# Patient Record
Sex: MALE | Race: WHITE | Employment: FULL TIME | URBAN - METROPOLITAN AREA
[De-identification: names, ages, dates, MRNs, and addresses within clinical notes are randomized per-mention and may not be internally consistent; named-entity substitution may affect disease eponyms.]

---

## 2018-05-24 ENCOUNTER — APPOINTMENT (OUTPATIENT)
Dept: CT IMAGING | Age: 43
DRG: 392 | End: 2018-05-24
Attending: EMERGENCY MEDICINE
Payer: COMMERCIAL

## 2018-05-24 ENCOUNTER — HOSPITAL ENCOUNTER (INPATIENT)
Age: 43
LOS: 8 days | Discharge: HOME | DRG: 392 | End: 2018-06-01
Attending: STUDENT IN AN ORGANIZED HEALTH CARE EDUCATION/TRAINING PROGRAM | Admitting: INTERNAL MEDICINE
Payer: COMMERCIAL

## 2018-05-24 DIAGNOSIS — K52.9 NONINFECTIOUS GASTROENTERITIS, UNSPECIFIED TYPE: Primary | ICD-10-CM

## 2018-05-24 PROBLEM — E87.1 HYPONATREMIA: Status: ACTIVE | Noted: 2018-05-24

## 2018-05-24 PROBLEM — M10.9 GOUT: Status: ACTIVE | Noted: 2018-05-24

## 2018-05-24 PROBLEM — R73.9 HYPERGLYCEMIA: Status: ACTIVE | Noted: 2018-05-24

## 2018-05-24 PROBLEM — I10 HTN (HYPERTENSION): Status: ACTIVE | Noted: 2018-05-24

## 2018-05-24 PROBLEM — K76.0 HEPATIC STEATOSIS: Status: ACTIVE | Noted: 2018-05-24

## 2018-05-24 PROBLEM — R74.01 TRANSAMINITIS: Status: ACTIVE | Noted: 2018-05-24

## 2018-05-24 PROBLEM — K57.92 DIVERTICULITIS: Status: ACTIVE | Noted: 2018-05-24

## 2018-05-24 PROBLEM — K40.90 INGUINAL HERNIA: Status: ACTIVE | Noted: 2018-05-24

## 2018-05-24 LAB
ALBUMIN SERPL-MCNC: 3.8 G/DL (ref 3.5–5)
ALBUMIN/GLOB SERPL: 0.8 {RATIO} (ref 1.1–2.2)
ALP SERPL-CCNC: 73 U/L (ref 45–117)
ALT SERPL-CCNC: 79 U/L (ref 12–78)
ANION GAP SERPL CALC-SCNC: 9 MMOL/L (ref 5–15)
APPEARANCE UR: CLEAR
AST SERPL-CCNC: 89 U/L (ref 15–37)
BACTERIA URNS QL MICRO: NEGATIVE /HPF
BASOPHILS # BLD: 0.1 K/UL (ref 0–0.1)
BASOPHILS NFR BLD: 1 % (ref 0–1)
BILIRUB SERPL-MCNC: 1 MG/DL (ref 0.2–1)
BILIRUB UR QL: NEGATIVE
BUN SERPL-MCNC: 8 MG/DL (ref 6–20)
BUN/CREAT SERPL: 9 (ref 12–20)
CALCIUM SERPL-MCNC: 9.2 MG/DL (ref 8.5–10.1)
CHLORIDE SERPL-SCNC: 98 MMOL/L (ref 97–108)
CO2 SERPL-SCNC: 27 MMOL/L (ref 21–32)
COLOR UR: ABNORMAL
CREAT SERPL-MCNC: 0.94 MG/DL (ref 0.7–1.3)
DIFFERENTIAL METHOD BLD: NORMAL
EOSINOPHIL # BLD: 0.1 K/UL (ref 0–0.4)
EOSINOPHIL NFR BLD: 1 % (ref 0–7)
EPITH CASTS URNS QL MICRO: ABNORMAL /LPF
ERYTHROCYTE [DISTWIDTH] IN BLOOD BY AUTOMATED COUNT: 11.9 % (ref 11.5–14.5)
GLOBULIN SER CALC-MCNC: 4.5 G/DL (ref 2–4)
GLUCOSE SERPL-MCNC: 216 MG/DL (ref 65–100)
GLUCOSE UR STRIP.AUTO-MCNC: NEGATIVE MG/DL
HCT VFR BLD AUTO: 45.7 % (ref 36.6–50.3)
HGB BLD-MCNC: 16.4 G/DL (ref 12.1–17)
HGB UR QL STRIP: NEGATIVE
HYALINE CASTS URNS QL MICRO: ABNORMAL /LPF (ref 0–5)
IMM GRANULOCYTES # BLD: 0 K/UL (ref 0–0.04)
IMM GRANULOCYTES NFR BLD AUTO: 0 % (ref 0–0.5)
KETONES UR QL STRIP.AUTO: ABNORMAL MG/DL
LACTATE SERPL-SCNC: 1.7 MMOL/L (ref 0.4–2)
LEUKOCYTE ESTERASE UR QL STRIP.AUTO: NEGATIVE
LIPASE SERPL-CCNC: 194 U/L (ref 73–393)
LYMPHOCYTES # BLD: 1.5 K/UL (ref 0.8–3.5)
LYMPHOCYTES NFR BLD: 17 % (ref 12–49)
MCH RBC QN AUTO: 32.4 PG (ref 26–34)
MCHC RBC AUTO-ENTMCNC: 35.9 G/DL (ref 30–36.5)
MCV RBC AUTO: 90.3 FL (ref 80–99)
MONOCYTES # BLD: 0.7 K/UL (ref 0–1)
MONOCYTES NFR BLD: 8 % (ref 5–13)
NEUTS SEG # BLD: 6.5 K/UL (ref 1.8–8)
NEUTS SEG NFR BLD: 73 % (ref 32–75)
NITRITE UR QL STRIP.AUTO: NEGATIVE
NRBC # BLD: 0 K/UL (ref 0–0.01)
NRBC BLD-RTO: 0 PER 100 WBC
PH UR STRIP: 6 [PH] (ref 5–8)
PLATELET # BLD AUTO: 238 K/UL (ref 150–400)
PMV BLD AUTO: 9.3 FL (ref 8.9–12.9)
POTASSIUM SERPL-SCNC: 4 MMOL/L (ref 3.5–5.1)
PROT SERPL-MCNC: 8.3 G/DL (ref 6.4–8.2)
PROT UR STRIP-MCNC: ABNORMAL MG/DL
RBC # BLD AUTO: 5.06 M/UL (ref 4.1–5.7)
RBC #/AREA URNS HPF: ABNORMAL /HPF (ref 0–5)
SODIUM SERPL-SCNC: 134 MMOL/L (ref 136–145)
SP GR UR REFRACTOMETRY: >1.03 (ref 1–1.03)
TROPONIN I SERPL-MCNC: <0.04 NG/ML
UR CULT HOLD, URHOLD: NORMAL
UROBILINOGEN UR QL STRIP.AUTO: 0.2 EU/DL (ref 0.2–1)
WBC # BLD AUTO: 8.9 K/UL (ref 4.1–11.1)
WBC URNS QL MICRO: ABNORMAL /HPF (ref 0–4)

## 2018-05-24 PROCEDURE — 85025 COMPLETE CBC W/AUTO DIFF WBC: CPT | Performed by: EMERGENCY MEDICINE

## 2018-05-24 PROCEDURE — 74177 CT ABD & PELVIS W/CONTRAST: CPT

## 2018-05-24 PROCEDURE — 74011000250 HC RX REV CODE- 250: Performed by: EMERGENCY MEDICINE

## 2018-05-24 PROCEDURE — 74011636320 HC RX REV CODE- 636/320: Performed by: STUDENT IN AN ORGANIZED HEALTH CARE EDUCATION/TRAINING PROGRAM

## 2018-05-24 PROCEDURE — 36415 COLL VENOUS BLD VENIPUNCTURE: CPT | Performed by: EMERGENCY MEDICINE

## 2018-05-24 PROCEDURE — 83036 HEMOGLOBIN GLYCOSYLATED A1C: CPT | Performed by: INTERNAL MEDICINE

## 2018-05-24 PROCEDURE — 65270000029 HC RM PRIVATE

## 2018-05-24 PROCEDURE — 99284 EMERGENCY DEPT VISIT MOD MDM: CPT

## 2018-05-24 PROCEDURE — 93005 ELECTROCARDIOGRAM TRACING: CPT

## 2018-05-24 PROCEDURE — 84484 ASSAY OF TROPONIN QUANT: CPT | Performed by: EMERGENCY MEDICINE

## 2018-05-24 PROCEDURE — 80053 COMPREHEN METABOLIC PANEL: CPT | Performed by: EMERGENCY MEDICINE

## 2018-05-24 PROCEDURE — 83605 ASSAY OF LACTIC ACID: CPT | Performed by: EMERGENCY MEDICINE

## 2018-05-24 PROCEDURE — 81001 URINALYSIS AUTO W/SCOPE: CPT | Performed by: EMERGENCY MEDICINE

## 2018-05-24 PROCEDURE — 74011250636 HC RX REV CODE- 250/636: Performed by: STUDENT IN AN ORGANIZED HEALTH CARE EDUCATION/TRAINING PROGRAM

## 2018-05-24 PROCEDURE — 96374 THER/PROPH/DIAG INJ IV PUSH: CPT

## 2018-05-24 PROCEDURE — 83690 ASSAY OF LIPASE: CPT | Performed by: EMERGENCY MEDICINE

## 2018-05-24 PROCEDURE — 74011250636 HC RX REV CODE- 250/636: Performed by: EMERGENCY MEDICINE

## 2018-05-24 RX ORDER — METRONIDAZOLE 500 MG/100ML
500 INJECTION, SOLUTION INTRAVENOUS
Status: COMPLETED | OUTPATIENT
Start: 2018-05-24 | End: 2018-05-24

## 2018-05-24 RX ORDER — HYDROMORPHONE HYDROCHLORIDE 2 MG/ML
1 INJECTION, SOLUTION INTRAMUSCULAR; INTRAVENOUS; SUBCUTANEOUS
Status: COMPLETED | OUTPATIENT
Start: 2018-05-24 | End: 2018-05-24

## 2018-05-24 RX ORDER — HYDRALAZINE HYDROCHLORIDE 20 MG/ML
10 INJECTION INTRAMUSCULAR; INTRAVENOUS
Status: DISCONTINUED | OUTPATIENT
Start: 2018-05-24 | End: 2018-06-01 | Stop reason: HOSPADM

## 2018-05-24 RX ORDER — COLCHICINE 0.6 MG/1
0.6 TABLET ORAL DAILY
COMMUNITY
End: 2019-12-18 | Stop reason: SDUPTHER

## 2018-05-24 RX ORDER — ONDANSETRON 2 MG/ML
4 INJECTION INTRAMUSCULAR; INTRAVENOUS
Status: COMPLETED | OUTPATIENT
Start: 2018-05-24 | End: 2018-05-24

## 2018-05-24 RX ORDER — LEVOFLOXACIN 5 MG/ML
750 INJECTION, SOLUTION INTRAVENOUS
Status: COMPLETED | OUTPATIENT
Start: 2018-05-24 | End: 2018-05-25

## 2018-05-24 RX ORDER — KETOROLAC TROMETHAMINE 30 MG/ML
15 INJECTION, SOLUTION INTRAMUSCULAR; INTRAVENOUS
Status: COMPLETED | OUTPATIENT
Start: 2018-05-24 | End: 2018-05-24

## 2018-05-24 RX ADMIN — METRONIDAZOLE 500 MG: 500 SOLUTION INTRAVENOUS at 22:18

## 2018-05-24 RX ADMIN — IOPAMIDOL 94 ML: 755 INJECTION, SOLUTION INTRAVENOUS at 20:30

## 2018-05-24 RX ADMIN — KETOROLAC TROMETHAMINE 15 MG: 30 INJECTION, SOLUTION INTRAMUSCULAR at 19:02

## 2018-05-24 RX ADMIN — ONDANSETRON 4 MG: 2 INJECTION INTRAMUSCULAR; INTRAVENOUS at 22:18

## 2018-05-24 RX ADMIN — SODIUM CHLORIDE 1000 ML: 900 INJECTION, SOLUTION INTRAVENOUS at 19:02

## 2018-05-24 RX ADMIN — HYDROMORPHONE HYDROCHLORIDE 1 MG: 2 INJECTION INTRAMUSCULAR; INTRAVENOUS; SUBCUTANEOUS at 22:18

## 2018-05-24 NOTE — ED TRIAGE NOTES
Pt reports \"all over\" abdominal pain starting today. Referred here by [facility]. Reports diarrhea the past few days. Denies nausea or vomiting.

## 2018-05-24 NOTE — IP AVS SNAPSHOT
[facility] 
 
 
 [address] 
[phone] Patient: Red Luong 
MRN: [id number] About your hospitalization You were admitted on:  May 24, 2018 You last received care in the:  [facility]  MED SURG 2 You were discharged on:  June 1, 2018 Why you were hospitalized Your primary diagnosis was:  Not on File Your diagnoses also included:  Colitis, Htn (Hypertension), Gout, Hyperglycemia, Transaminitis, Inguinal Hernia, Hyponatremia, Hepatic Steatosis, Diverticulitis, Dm (Diabetes Mellitus) (Hcc) Follow-up Information Follow up With Details Comments Contact Info None   None ([phone]) Patient stated that they have no PCP Your Scheduled Appointments Wednesday June 27, 2018  1:00 PM EDT  
DIABETES CLASS 1HR with SURVIVAL SKILLS CLASS [facility]  
[facility] ([facility]) [place] [address] [address]  
[phone] Located in the [facility] (off site, off of [facility]). Discharge Orders None A check tami indicates which time of day the medication should be taken. My Medications START taking these medications Instructions Each Dose to Equal  
 Morning Noon Evening Bedtime  
 indomethacin 50 mg capsule Commonly known as:  INDOCIN Take 1 Cap by mouth three (3) times daily for 90 days. 50 mg  
    
  
   
  
   
  
   
  
 metFORMIN 500 mg tablet Commonly known as:  GLUCOPHAGE Take 1 Tab by mouth two (2) times daily (with meals). 500 mg CONTINUE taking these medications Instructions Each Dose to Equal  
 Morning Noon Evening Bedtime  
 colchicine 0.6 mg tablet Take 0.6 mg by mouth daily. 0.6 mg  
    
  
   
   
   
  
 omeprazole 20 mg capsule Commonly known as:  PRILOSEC Take 20 mg by mouth daily. 20 mg Where to Get Your Medications Information on where to get these meds will be given to you by the nurse or doctor. ! Ask your nurse or doctor about these medications  
  indomethacin 50 mg capsule  
 metFORMIN 500 mg tablet Discharge Instructions ACUTE DIAGNOSES: 
Colitis Diverticulitis CHRONIC MEDICAL DIAGNOSES: 
Problem List as of 6/1/2018  Date Reviewed: 5/24/2018 Codes Class Noted - Resolved DM (diabetes mellitus) ([facility] ICD-10-CM: E11.9 ICD-9-CM: 250.00  6/1/2018 - Present Colitis ICD-10-CM: K52.9 ICD-9-CM: 558.9  5/24/2018 - Present HTN (hypertension) ICD-10-CM: I10 
ICD-9-CM: 401.9  5/24/2018 - Present Gout ICD-10-CM: M10.9 ICD-9-CM: 274.9  5/24/2018 - Present Inguinal hernia ICD-10-CM: K40.90 ICD-9-CM: 550.90  5/24/2018 - Present Hepatic steatosis ICD-10-CM: K76.0 ICD-9-CM: 571.8  5/24/2018 - Present RESOLVED: Hyperglycemia ICD-10-CM: R73.9 ICD-9-CM: 790.29  5/24/2018 - 6/1/2018 RESOLVED: Transaminitis ICD-10-CM: R74.0 ICD-9-CM: 790.4  5/24/2018 - 6/1/2018 RESOLVED: Hyponatremia ICD-10-CM: E87.1 ICD-9-CM: 276.1  5/24/2018 - 6/1/2018 RESOLVED: Diverticulitis ICD-10-CM: K57.92 
ICD-9-CM: 562.11  5/24/2018 - 6/1/2018 DISCHARGE MEDICATIONS:  
  
 
 
· It is important that you take the medication exactly as they are prescribed. · Keep your medication in the bottles provided by the pharmacist and keep a list of the medication names, dosages, and times to be taken in your wallet. · Do not take other medications without consulting your doctor. DIET:  Diabetic Diet ACTIVITY: Activity as tolerated ADDITIONAL INFORMATION: If you experience any of the following symptoms then please call your primary care physician or return to the emergency room if you cannot get hold of your doctor: Fever, chills, nausea, vomiting, diarrhea, change in mentation, falling, bleeding, shortness of breath. FOLLOW UP CARE: 
primary care physician. you are to call and set up an appointment to see them in 2 weeks. Follow-up with gastroeneterology in 2 week Follow -up with rheumatology in 1-2 weeks Information obtained by : 
I understand that if any problems occur once I am at home I am to contact my physician. I understand and acknowledge receipt of the instructions indicated above. Physician's or R.N.'s Signature                                                                  Date/Time Patient or Representative Signature                                                          Date/Time mTraks Announcement We are excited to announce that we are making your provider's discharge notes available to you in mTraks. You will see these notes when they are completed and signed by the physician that discharged you from your recent hospital stay. If you have any questions or concerns about any information you see in mTraks, please call the Health Information Department where you were seen or reach out to your Primary Care Provider for more information about your plan of care. Introducing hospitals & HEALTH SERVICES! Virginia Lindsay introduces mTraks patient portal. Now you can access parts of your medical record, email your doctor's office, and request medication refills online. 1. In your internet browser, go to [email or web address] 2. Click on the First Time User? Click Here link in the Sign In box. You will see the New Member Sign Up page. 3. Enter your mTraks Access Code exactly as it appears below.  You will not need to use this code after youve completed the sign-up process. If you do not sign up before the expiration date, you must request a new code. · "[employer]" Access Code: [facility] Expires: 8/22/2018  6:18 PM 
 
4. Enter the last four digits of your Social Security Number (xxxx) and Date of Birth (mm/dd/yyyy) as indicated and click Submit. You will be taken to the next sign-up page. 5. Create a VSS Monitoringt ID. This will be your "[employer]" login ID and cannot be changed, so think of one that is secure and easy to remember. 6. Create a "[employer]" password. You can change your password at any time. 7. Enter your Password Reset Question and Answer. This can be used at a later time if you forget your password. 8. Enter your e-mail address. You will receive e-mail notification when new information is available in [address] 9. Click Sign Up. You can now view and download portions of your medical record. 10. Click the Download Summary menu link to download a portable copy of your medical information. If you have questions, please visit the Frequently Asked Questions section of the "[employer]" website. Remember, "[employer]" is NOT to be used for urgent needs. For medical emergencies, dial 911. Now available from your iPhone and Android! Introducing Alex Wilkins As a [facility] patient, I wanted to make you aware of our electronic visit tool called Alex Franky. Marco A Rod 24/7 allows you to connect within minutes with a medical provider 24 hours a day, seven days a week via a mobile device or tablet or logging into a secure website from your computer. You can access Alex Wilkins from anywhere in the [place].  
 
A virtual visit might be right for you when you have a simple condition and feel like you just dont want to get out of bed, or cant get away from work for an appointment, when your regular [facility] provider is not available (evenings, weekends or holidays), or when youre out of town and need minor care. Electronic visits cost only $49 and if the [employer] 24/7 provider determines a prescription is needed to treat your condition, one can be electronically transmitted to a nearby pharmacy*. Please take a moment to enroll today if you have not already done so. The enrollment process is free and takes just a few minutes. To enroll, please download the [employer] sunitha to your tablet or phone, or visit [email or web address] to enroll on your computer. And, as an [address] [place] [address] patient with a [employer] account, the results of your visits will be scanned into your electronic medical record and your primary care provider will be able to view the scanned results. We urge you to continue to see your regular [facility] provider for your ongoing medical care. And while your primary care provider may not be the one available when you seek a Zazum virtual visit, the peace of mind you get from getting a real diagnosis real time can be priceless. For more information on Zazum, view our Frequently Asked Questions (FAQs) at [email or web address]. Sincerely, 
 
Tia Saldaña MD 
Chief Medical Officer [facility] Angelique Jorge *:  certain medications cannot be prescribed via Zazum Unresulted Labs-Please follow up with your PCP about these lab tests Order Current Status CULTURE, ANAEROBIC Preliminary result CULTURE, BODY FLUID W GRAM STAIN Preliminary result Providers Seen During Your Hospitalization Provider Specialty Primary office phone Carola Nuñez MD Emergency Medicine [phone] Catrachito Bucio DO Internal Medicine [phone] Gregorio Herzog MD Hospitalist [phone] Your Primary Care Physician (PCP) Primary Care Physician Office Phone Office Fax  NONE ** None ** ** None **  
  
 You are allergic to the following No active allergies Recent Documentation Height Weight BMI Smoking Status 1.829 m 98.9 kg 29.57 kg/m2 Never Smoker Emergency Contacts Name Discharge Info Relation Home Work Mobile Angelique García DISCHARGE CAREGIVER [3] Spouse [3] [phone] Patient Belongings The following personal items are in your possession at time of discharge: 
  Dental Appliances: None  Visual Aid: Glasses      Home Medications: None   Jewelry: Necklace, Ring  Clothing: At bedside    Other Valuables: None Please provide this summary of care documentation to your next provider. Signatures-by signing, you are acknowledging that this After Visit Summary has been reviewed with you and you have received a copy. Patient Signature:  ____________________________________________________________ Date:  ____________________________________________________________  
  
[facility] Provider Signature:  ____________________________________________________________ Date:  ____________________________________________________________

## 2018-05-24 NOTE — ED PROVIDER NOTES
HPI Comments: 38 y.o. male with past medical history significant for HTN and gout who presents to the ED with chief complaint of abdominal pain. Pt reports this morning after having a cup of coffee he began having diffuse abdominal pain \"all over\" that \"started in the middle and radiated out\" and has gotten progressively worse since onset. Pt describes his pain as \"a burning sensation shooting throughout. \" Pt states his abdominal pain is accompanied by chills and \"feeling hot. \" Pt denies hx of similar sx. Pt states he tried having milk, dry cereal, yogurt, water, and apple cider vinegar without relief. Pt states he was seen at [facility] for his sx and was referred to the ED for further evaluation. Pt states his last BM was this morning and was abnormal. Pt states he has hx of gout and hiatal hernia. Pt denies hx of abdominal surgeries. Pt denies blood in stool. There are no other acute medical complaints voiced at this time. Social Hx: Never smoker. Denies drug use. Occasional EtOH use (none recently). PCP: None    Note written by Jolanta Ramos, as dictated by Rosa Robles MD 6:39 PM     The history is provided by the patient. Past Medical History:   Diagnosis Date    Gout     Hypertension        Past Surgical History:   Procedure Laterality Date    HX WISDOM TEETH EXTRACTION           No family history on file. Social History     Social History    Marital status:      Spouse name: N/A    Number of children: N/A    Years of education: N/A     Occupational History    Not on file. Social History Main Topics    Smoking status: Never Smoker    Smokeless tobacco: Never Used    Alcohol use 1.2 oz/week     2 Glasses of wine per week    Drug use: No    Sexual activity: Not on file     Other Topics Concern    Not on file     Social History Narrative    No narrative on file         ALLERGIES: Review of patient's allergies indicates no known allergies.     Review of Systems   Constitutional: Positive for chills. Negative for diaphoresis, fatigue and fever. HENT: Negative for congestion, rhinorrhea, sinus pressure, sore throat, trouble swallowing and voice change. Eyes: Negative for photophobia and visual disturbance. Respiratory: Negative for cough, chest tightness and shortness of breath. Cardiovascular: Negative for chest pain, palpitations and leg swelling. Gastrointestinal: Positive for abdominal pain. Negative for blood in stool, constipation, diarrhea, nausea and vomiting. Musculoskeletal: Negative for arthralgias, myalgias and neck pain. Neurological: Negative for dizziness, weakness, light-headedness, numbness and headaches. All other systems reviewed and are negative. Vitals:    05/24/18 1819   BP: (!) 172/106   Pulse: 86   Resp: 18   Temp: 98.9 °F (37.2 °C)   SpO2: 100%   Weight: 98.9 kg (218 lb)   Height: 6' (1.829 m)            Physical Exam   Constitutional: He is oriented to person, place, and time. He appears well-developed and well-nourished. No distress. Uncomfortable appearing. HENT:   Head: Normocephalic and atraumatic. Nose: Nose normal.   Mouth/Throat: Oropharynx is clear and moist. No oropharyngeal exudate. Eyes: Conjunctivae and EOM are normal. Right eye exhibits no discharge. Left eye exhibits no discharge. No scleral icterus. Neck: Normal range of motion. Neck supple. No JVD present. No tracheal deviation present. No thyromegaly present. Cardiovascular: Normal rate, regular rhythm, normal heart sounds and intact distal pulses. Exam reveals no gallop and no friction rub. No murmur heard. Pulmonary/Chest: Effort normal and breath sounds normal. No stridor. No respiratory distress. He has no wheezes. He has no rales. He exhibits no tenderness. Abdominal: He exhibits no distension and no mass. There is tenderness (throughout abdomen). There is no rebound. Hypoactive bowel sounds.     Musculoskeletal: Normal range of motion. He exhibits no edema or tenderness. Lymphadenopathy:     He has no cervical adenopathy. Neurological: He is alert and oriented to person, place, and time. No cranial nerve deficit. Coordination normal.   Skin: Skin is warm and dry. No rash noted. He is not diaphoretic. No erythema. No pallor. Psychiatric: He has a normal mood and affect. His behavior is normal. Judgment and thought content normal.   Nursing note and vitals reviewed. Note written by Jolanta Hernandez, as dictated by Travis Smith MD 6:40 PM    MDM  Number of Diagnoses or Management Options  Noninfectious gastroenteritis, unspecified type:      Amount and/or Complexity of Data Reviewed  Clinical lab tests: ordered and reviewed  Tests in the radiology section of CPT®: reviewed and ordered  Review and summarize past medical records: yes  Discuss the patient with other providers: yes  Independent visualization of images, tracings, or specimens: yes    Risk of Complications, Morbidity, and/or Mortality  Presenting problems: moderate  Diagnostic procedures: moderate  Management options: moderate    Patient Progress  Patient progress: stable        ED Course       Procedures    ED EKG interpretation:  Rhythm: normal sinus rhythm; and regular . Rate (approx.): 69; ST/T wave: No ST or T wave abnormalities. Note written by Jolanta Hernandez, as dictated by Travis Smith MD 7:24 PM      9:23 AM  Patient is being admitted to the hospital.  The results of their tests and reasons for their admission have been discussed with them and/or available family. They convey agreement and understanding for the need to be admitted and for their admission diagnosis. Consultation has been made with the inpatient physician specialist for hospitalization.     LABORATORY TESTS:  Recent Results (from the past 12 hour(s))   GLUCOSE, POC    Collection Time: 05/26/18  9:30 PM   Result Value Ref Range    Glucose (POC) 170 (H) 65 - 100 mg/dL    Performed by Clifm Prader (PCT)    METABOLIC PANEL, BASIC    Collection Time: 05/27/18  5:18 AM   Result Value Ref Range    Sodium 132 (L) 136 - 145 mmol/L    Potassium 3.6 3.5 - 5.1 mmol/L    Chloride 97 97 - 108 mmol/L    CO2 24 21 - 32 mmol/L    Anion gap 11 5 - 15 mmol/L    Glucose 155 (H) 65 - 100 mg/dL    BUN 6 6 - 20 MG/DL    Creatinine 0.92 0.70 - 1.30 MG/DL    BUN/Creatinine ratio 7 (L) 12 - 20      GFR est AA >60 >60 ml/min/1.73m2    GFR est non-AA >60 >60 ml/min/1.73m2    Calcium 8.2 (L) 8.5 - 10.1 MG/DL   CBC WITH AUTOMATED DIFF    Collection Time: 05/27/18  5:18 AM   Result Value Ref Range    WBC 16.5 (H) 4.1 - 11.1 K/uL    RBC 4.22 4.10 - 5.70 M/uL    HGB 13.7 12.1 - 17.0 g/dL    HCT 39.3 36.6 - 50.3 %    MCV 93.1 80.0 - 99.0 FL    MCH 32.5 26.0 - 34.0 PG    MCHC 34.9 30.0 - 36.5 g/dL    RDW 11.9 11.5 - 14.5 %    PLATELET 362 914 - 024 K/uL    MPV 9.0 8.9 - 12.9 FL    NRBC 0.0 0  WBC    ABSOLUTE NRBC 0.00 0.00 - 0.01 K/uL    NEUTROPHILS 79 (H) 32 - 75 %    LYMPHOCYTES 11 (L) 12 - 49 %    MONOCYTES 9 5 - 13 %    EOSINOPHILS 0 0 - 7 %    BASOPHILS 0 0 - 1 %    IMMATURE GRANULOCYTES 1 (H) 0.0 - 0.5 %    ABS. NEUTROPHILS 13.0 (H) 1.8 - 8.0 K/UL    ABS. LYMPHOCYTES 1.8 0.8 - 3.5 K/UL    ABS. MONOCYTES 1.5 (H) 0.0 - 1.0 K/UL    ABS. EOSINOPHILS 0.0 0.0 - 0.4 K/UL    ABS. BASOPHILS 0.1 0.0 - 0.1 K/UL    ABS. IMM.  GRANS. 0.1 (H) 0.00 - 0.04 K/UL    DF AUTOMATED     MAGNESIUM    Collection Time: 05/27/18  5:18 AM   Result Value Ref Range    Magnesium 1.4 (L) 1.6 - 2.4 mg/dL   PHOSPHORUS    Collection Time: 05/27/18  5:18 AM   Result Value Ref Range    Phosphorus 2.9 2.6 - 4.7 MG/DL   HEPATIC FUNCTION PANEL    Collection Time: 05/27/18  5:18 AM   Result Value Ref Range    Protein, total 7.2 6.4 - 8.2 g/dL    Albumin 2.8 (L) 3.5 - 5.0 g/dL    Globulin 4.4 (H) 2.0 - 4.0 g/dL    A-G Ratio 0.6 (L) 1.1 - 2.2      Bilirubin, total 1.4 (H) 0.2 - 1.0 MG/DL    Bilirubin, direct 0.5 (H) 0.0 - 0.2 MG/DL Alk. phosphatase 62 45 - 117 U/L    AST (SGOT) 19 15 - 37 U/L    ALT (SGPT) 39 12 - 78 U/L   GLUCOSE, POC    Collection Time: 05/27/18  7:09 AM   Result Value Ref Range    Glucose (POC) 166 (H) 65 - 100 mg/dL    Performed by Raoul Morrison (PCT)        IMAGING RESULTS:  CT ABD PELV W CONT   Final Result        No results found.     MEDICATIONS GIVEN:  Medications   pantoprazole (PROTONIX) tablet 40 mg (40 mg Oral Given 5/27/18 0854)   sodium chloride (NS) flush 5-10 mL (10 mL IntraVENous Given 5/27/18 0512)   sodium chloride (NS) flush 5-10 mL (10 mL IntraVENous Given 5/25/18 0040)   0.9% sodium chloride infusion (125 mL/hr IntraVENous New Bag 5/27/18 0644)   heparin (porcine) injection 5,000 Units (5,000 Units SubCUTAneous Given 5/27/18 0511)   hydrALAZINE (APRESOLINE) 20 mg/mL injection 10 mg (not administered)   HYDROmorphone (DILAUDID) injection 0.5 mg (0.5 mg IntraVENous Given 5/27/18 0907)   glucose chewable tablet 16 g (not administered)   dextrose (D50W) injection syrg 12.5-25 g (not administered)   glucagon (GLUCAGEN) injection 1 mg (not administered)   insulin lispro (HUMALOG) injection (0 Units SubCUTAneous Held 5/27/18 0730)   oxyCODONE-acetaminophen (PERCOCET) 5-325 mg per tablet 1 Tab (1 Tab Oral Given 5/27/18 0511)   colchicine (MITIGARE) capsule 0.6 mg (0.6 mg Oral Given 5/27/18 0907)   levoFLOXacin (LEVAQUIN) tablet 500 mg (not administered)   metroNIDAZOLE (FLAGYL) tablet 500 mg (500 mg Oral Given 5/27/18 0855)   magnesium sulfate 2 g/50 ml IVPB (premix or compounded) (2 g IntraVENous New Bag 5/27/18 0854)   sodium chloride 0.9 % bolus infusion 1,000 mL (0 mL IntraVENous IV Completed 5/24/18 2002)   ketorolac (TORADOL) injection 15 mg (15 mg IntraVENous Given 5/24/18 1902)   iopamidol (ISOVUE-370) 76 % injection 100 mL (94 mL IntraVENous Given 5/24/18 2030)   HYDROmorphone (DILAUDID) injection 1 mg (1 mg IntraVENous Given 5/24/18 2218)   ondansetron (ZOFRAN) injection 4 mg (4 mg IntraVENous Given 5/24/18 2218)   levoFLOXacin (LEVAQUIN) 750 mg in D5W IVPB (750 mg IntraVENous New Bag 5/25/18 0013)   metroNIDAZOLE (FLAGYL) IVPB premix 500 mg (500 mg IntraVENous New Bag 5/24/18 2218)       IMPRESSION:  No diagnosis found. PLAN:  1.  Admit to Donaldo Jensen MD

## 2018-05-24 NOTE — ED NOTES
7:42 PM  Change of shift. Care of patient signed over to Dr. Rakesh Lewis. Bedside handoff complete. PROGRESS NOTE:  9:52 PM CT scan revealed colitis, possible Meckels diverticulum and a L inguinal hernia. Reevaluated the pt. Inguinal hernia is easily reduced. Pt continues to c/o diffuse abdominal pain. Will admit for colitis and IV ABX. CONSULT NOTE:  9:52 PM Laverne Noble MD spoke with Dr. Manny Ratliff, Consult for Hospitalist.  Discussed available diagnostic tests and clinical findings. He is in agreement with care plans as outlined. Dr. Manny Ratliff will see and admit pt for further evaluation of treatment.

## 2018-05-24 NOTE — IP AVS SNAPSHOT
Summary of Care Report The Summary of Care report has been created to help improve care coordination. Users with access to [employer] or [address] (Web-based application) may access additional patient information including the Discharge Summary. If you are not currently a [address] user and need more information, please call the number listed below in the [place] [address] section and ask to be connected with Medical Records. Facility Information Name Address Phone [address] [place] [id number] [phone] Patient Information Patient Name Sex  Ernestina Brody ([id number]) Male 1975 Discharge Information Admitting Provider Service Area Unit Víctor Li MD / [place]  / [phone] Discharge Provider Discharge Date/Time Discharge Disposition Destination (none) 2018 (Pending) AHR (none) Patient Language Language ENGLISH [13] Hospital Problems as of 2018  Reviewed: 2018 10:23 PM by Víctor Li MD  
  
  
  
 Class Noted - Resolved Last Modified POA Active Problems Colitis  2018 - Present 2018 by Víctor Li MD Unknown Entered by Víctor Li MD  
  HTN (hypertension)  2018 - Present 2018 by Víctor Li MD Unknown Entered by Víctor Li MD  
  Gout  2018 - Present 2018 by Víctor Li MD Unknown Entered by Víctor Li MD  
  Inguinal hernia  2018 - Present 2018 by Víctor Li MD Unknown Entered by Víctor Li MD  
  Hepatic steatosis  2018 - Present 2018 by Víctor Li MD Unknown Entered by Víctor Li MD  
  DM (diabetes mellitus) ([address]  2018 - Present 2018 by Víctor Li MD Unknown   Entered by Víctor Li MD  
  
 Non-Hospital Problems as of 6/1/2018  Reviewed: 5/24/2018 10:23 PM by Yogesh Pimentel MD  
 None You are allergic to the following No active allergies Current Discharge Medication List  
  
START taking these medications Dose & Instructions Dispensing Information Comments  
 indomethacin 50 mg capsule Commonly known as:  INDOCIN Dose:  50 mg Take 1 Cap by mouth three (3) times daily for 90 days. Quantity:  30 Cap Refills:  0  
   
 metFORMIN 500 mg tablet Commonly known as:  GLUCOPHAGE Dose:  500 mg Take 1 Tab by mouth two (2) times daily (with meals). Quantity:  60 Tab Refills:  2 CONTINUE these medications which have NOT CHANGED Dose & Instructions Dispensing Information Comments  
 colchicine 0.6 mg tablet Dose:  0.6 mg Take 0.6 mg by mouth daily. Refills:  0  
   
 omeprazole 20 mg capsule Commonly known as:  PRILOSEC Dose:  20 mg Take 20 mg by mouth daily. Refills:  0 Follow-up Information Follow up With Details Comments Contact Info None   None (395) Patient stated that they have no PCP Discharge Instructions ACUTE DIAGNOSES: 
Colitis Diverticulitis CHRONIC MEDICAL DIAGNOSES: 
Problem List as of 6/1/2018  Date Reviewed: 5/24/2018 Codes Class Noted - Resolved DM (diabetes mellitus) ([facility] ICD-10-CM: E11.9 ICD-9-CM: 250.00  6/1/2018 - Present Colitis ICD-10-CM: K52.9 ICD-9-CM: 558.9  5/24/2018 - Present HTN (hypertension) ICD-10-CM: I10 
ICD-9-CM: 401.9  5/24/2018 - Present Gout ICD-10-CM: M10.9 ICD-9-CM: 274.9  5/24/2018 - Present Inguinal hernia ICD-10-CM: K40.90 ICD-9-CM: 550.90  5/24/2018 - Present Hepatic steatosis ICD-10-CM: K76.0 ICD-9-CM: 571.8  5/24/2018 - Present RESOLVED: Hyperglycemia ICD-10-CM: R73.9 ICD-9-CM: 790.29  5/24/2018 - 6/1/2018 RESOLVED: Transaminitis ICD-10-CM: R74.0 ICD-9-CM: 790.4  5/24/2018 - 6/1/2018 RESOLVED: Hyponatremia ICD-10-CM: E87.1 ICD-9-CM: 276.1  5/24/2018 - 6/1/2018 RESOLVED: Diverticulitis ICD-10-CM: K57.92 
ICD-9-CM: 562.11  5/24/2018 - 6/1/2018 DISCHARGE MEDICATIONS:  
  
 
 
· It is important that you take the medication exactly as they are prescribed. · Keep your medication in the bottles provided by the pharmacist and keep a list of the medication names, dosages, and times to be taken in your wallet. · Do not take other medications without consulting your doctor. DIET:  Diabetic Diet ACTIVITY: Activity as tolerated ADDITIONAL INFORMATION: If you experience any of the following symptoms then please call your primary care physician or return to the emergency room if you cannot get hold of your doctor: Fever, chills, nausea, vomiting, diarrhea, change in mentation, falling, bleeding, shortness of breath. FOLLOW UP CARE: 
primary care physician. you are to call and set up an appointment to see them in 2 weeks. Follow-up with gastroeneterology in 2 week Follow -up with rheumatology in 1-2 weeks Information obtained by : 
I understand that if any problems occur once I am at home I am to contact my physician. I understand and acknowledge receipt of the instructions indicated above. Physician's or R.N.'s Signature                                                                  Date/Time Patient or Representative Signature                                                          Date/Time Chart Review Routing History No Routing History on File

## 2018-05-24 NOTE — Clinical Note
Status[de-identified] Inpatient [101] Type of Bed: Medical [8] Inpatient Hospitalization Certified Necessary for the Following Reasons: 3. Patient receiving treatment that can only be provided in an inpatient setting (further clarification in H&P documentation) Admitting Diagnosis: Colitis [[id number]] Admitting Physician: David Kapadia Attending Physician: David Kapadia Estimated Length of Stay: 2 Midnights Discharge Plan[de-identified] Home with Office Follow-up

## 2018-05-25 LAB
ALBUMIN SERPL-MCNC: 3.1 G/DL (ref 3.5–5)
ALBUMIN/GLOB SERPL: 0.8 {RATIO} (ref 1.1–2.2)
ALP SERPL-CCNC: 57 U/L (ref 45–117)
ALT SERPL-CCNC: 65 U/L (ref 12–78)
ANION GAP SERPL CALC-SCNC: 11 MMOL/L (ref 5–15)
AST SERPL-CCNC: 67 U/L (ref 15–37)
ATRIAL RATE: 69 BPM
BASOPHILS # BLD: 0 K/UL (ref 0–0.1)
BASOPHILS NFR BLD: 0 % (ref 0–1)
BILIRUB DIRECT SERPL-MCNC: 0.4 MG/DL (ref 0–0.2)
BILIRUB SERPL-MCNC: 0.7 MG/DL (ref 0.2–1)
BUN SERPL-MCNC: 9 MG/DL (ref 6–20)
BUN/CREAT SERPL: 11 (ref 12–20)
CALCIUM SERPL-MCNC: 8.4 MG/DL (ref 8.5–10.1)
CALCULATED P AXIS, ECG09: 15 DEGREES
CALCULATED R AXIS, ECG10: 56 DEGREES
CALCULATED T AXIS, ECG11: 43 DEGREES
CHLORIDE SERPL-SCNC: 100 MMOL/L (ref 97–108)
CO2 SERPL-SCNC: 24 MMOL/L (ref 21–32)
CREAT SERPL-MCNC: 0.82 MG/DL (ref 0.7–1.3)
DIAGNOSIS, 93000: NORMAL
DIFFERENTIAL METHOD BLD: NORMAL
EOSINOPHIL # BLD: 0.1 K/UL (ref 0–0.4)
EOSINOPHIL NFR BLD: 1 % (ref 0–7)
ERYTHROCYTE [DISTWIDTH] IN BLOOD BY AUTOMATED COUNT: 11.7 % (ref 11.5–14.5)
EST. AVERAGE GLUCOSE BLD GHB EST-MCNC: 192 MG/DL
GLOBULIN SER CALC-MCNC: 3.7 G/DL (ref 2–4)
GLUCOSE BLD STRIP.AUTO-MCNC: 174 MG/DL (ref 65–100)
GLUCOSE BLD STRIP.AUTO-MCNC: 224 MG/DL (ref 65–100)
GLUCOSE SERPL-MCNC: 165 MG/DL (ref 65–100)
HBA1C MFR BLD: 8.3 % (ref 4.2–6.3)
HCT VFR BLD AUTO: 40.7 % (ref 36.6–50.3)
HGB BLD-MCNC: 14.3 G/DL (ref 12.1–17)
IMM GRANULOCYTES # BLD: 0 K/UL (ref 0–0.04)
IMM GRANULOCYTES NFR BLD AUTO: 0 % (ref 0–0.5)
LYMPHOCYTES # BLD: 2.2 K/UL (ref 0.8–3.5)
LYMPHOCYTES NFR BLD: 22 % (ref 12–49)
MCH RBC QN AUTO: 32 PG (ref 26–34)
MCHC RBC AUTO-ENTMCNC: 35.1 G/DL (ref 30–36.5)
MCV RBC AUTO: 91.1 FL (ref 80–99)
MONOCYTES # BLD: 1 K/UL (ref 0–1)
MONOCYTES NFR BLD: 10 % (ref 5–13)
NEUTS SEG # BLD: 6.5 K/UL (ref 1.8–8)
NEUTS SEG NFR BLD: 66 % (ref 32–75)
NRBC # BLD: 0 K/UL (ref 0–0.01)
NRBC BLD-RTO: 0 PER 100 WBC
P-R INTERVAL, ECG05: 150 MS
PLATELET # BLD AUTO: 210 K/UL (ref 150–400)
PMV BLD AUTO: 9.2 FL (ref 8.9–12.9)
POTASSIUM SERPL-SCNC: 3.9 MMOL/L (ref 3.5–5.1)
PROT SERPL-MCNC: 6.8 G/DL (ref 6.4–8.2)
Q-T INTERVAL, ECG07: 400 MS
QRS DURATION, ECG06: 80 MS
QTC CALCULATION (BEZET), ECG08: 428 MS
RBC # BLD AUTO: 4.47 M/UL (ref 4.1–5.7)
SERVICE CMNT-IMP: ABNORMAL
SERVICE CMNT-IMP: ABNORMAL
SODIUM SERPL-SCNC: 135 MMOL/L (ref 136–145)
VENTRICULAR RATE, ECG03: 69 BPM
WBC # BLD AUTO: 10 K/UL (ref 4.1–11.1)

## 2018-05-25 PROCEDURE — 65270000029 HC RM PRIVATE

## 2018-05-25 PROCEDURE — 80076 HEPATIC FUNCTION PANEL: CPT | Performed by: INTERNAL MEDICINE

## 2018-05-25 PROCEDURE — 82962 GLUCOSE BLOOD TEST: CPT

## 2018-05-25 PROCEDURE — 74011636637 HC RX REV CODE- 636/637: Performed by: INTERNAL MEDICINE

## 2018-05-25 PROCEDURE — 85025 COMPLETE CBC W/AUTO DIFF WBC: CPT | Performed by: INTERNAL MEDICINE

## 2018-05-25 PROCEDURE — 36415 COLL VENOUS BLD VENIPUNCTURE: CPT | Performed by: INTERNAL MEDICINE

## 2018-05-25 PROCEDURE — 80048 BASIC METABOLIC PNL TOTAL CA: CPT | Performed by: INTERNAL MEDICINE

## 2018-05-25 PROCEDURE — 74011250636 HC RX REV CODE- 250/636: Performed by: INTERNAL MEDICINE

## 2018-05-25 PROCEDURE — 74011000250 HC RX REV CODE- 250: Performed by: INTERNAL MEDICINE

## 2018-05-25 PROCEDURE — 74011250637 HC RX REV CODE- 250/637: Performed by: INTERNAL MEDICINE

## 2018-05-25 PROCEDURE — 74011250636 HC RX REV CODE- 250/636: Performed by: EMERGENCY MEDICINE

## 2018-05-25 RX ORDER — MAGNESIUM SULFATE 100 %
4 CRYSTALS MISCELLANEOUS AS NEEDED
Status: DISCONTINUED | OUTPATIENT
Start: 2018-05-25 | End: 2018-06-01 | Stop reason: HOSPADM

## 2018-05-25 RX ORDER — COLCHICINE 0.6 MG/1
0.6 TABLET ORAL DAILY
Status: CANCELLED | OUTPATIENT
Start: 2018-05-25

## 2018-05-25 RX ORDER — DEXTROSE 50 % IN WATER (D50W) INTRAVENOUS SYRINGE
12.5-25 AS NEEDED
Status: DISCONTINUED | OUTPATIENT
Start: 2018-05-25 | End: 2018-06-01 | Stop reason: HOSPADM

## 2018-05-25 RX ORDER — PANTOPRAZOLE SODIUM 40 MG/1
40 TABLET, DELAYED RELEASE ORAL DAILY
Status: DISCONTINUED | OUTPATIENT
Start: 2018-05-25 | End: 2018-06-01 | Stop reason: HOSPADM

## 2018-05-25 RX ORDER — LEVOFLOXACIN 5 MG/ML
500 INJECTION, SOLUTION INTRAVENOUS EVERY 24 HOURS
Status: DISCONTINUED | OUTPATIENT
Start: 2018-05-25 | End: 2018-05-27

## 2018-05-25 RX ORDER — INSULIN LISPRO 100 [IU]/ML
INJECTION, SOLUTION INTRAVENOUS; SUBCUTANEOUS
Status: DISCONTINUED | OUTPATIENT
Start: 2018-05-25 | End: 2018-06-01 | Stop reason: HOSPADM

## 2018-05-25 RX ORDER — METRONIDAZOLE 500 MG/100ML
500 INJECTION, SOLUTION INTRAVENOUS EVERY 6 HOURS
Status: DISCONTINUED | OUTPATIENT
Start: 2018-05-25 | End: 2018-05-25

## 2018-05-25 RX ORDER — MORPHINE SULFATE 4 MG/ML
2 INJECTION INTRAVENOUS
Status: DISCONTINUED | OUTPATIENT
Start: 2018-05-25 | End: 2018-05-25

## 2018-05-25 RX ORDER — SODIUM CHLORIDE 0.9 % (FLUSH) 0.9 %
5-10 SYRINGE (ML) INJECTION EVERY 8 HOURS
Status: DISCONTINUED | OUTPATIENT
Start: 2018-05-25 | End: 2018-06-01 | Stop reason: HOSPADM

## 2018-05-25 RX ORDER — SODIUM CHLORIDE 0.9 % (FLUSH) 0.9 %
5-10 SYRINGE (ML) INJECTION AS NEEDED
Status: DISCONTINUED | OUTPATIENT
Start: 2018-05-25 | End: 2018-06-01 | Stop reason: HOSPADM

## 2018-05-25 RX ORDER — METRONIDAZOLE 500 MG/100ML
500 INJECTION, SOLUTION INTRAVENOUS EVERY 12 HOURS
Status: DISCONTINUED | OUTPATIENT
Start: 2018-05-25 | End: 2018-05-27

## 2018-05-25 RX ORDER — HEPARIN SODIUM 5000 [USP'U]/ML
5000 INJECTION, SOLUTION INTRAVENOUS; SUBCUTANEOUS EVERY 8 HOURS
Status: DISCONTINUED | OUTPATIENT
Start: 2018-05-25 | End: 2018-06-01 | Stop reason: HOSPADM

## 2018-05-25 RX ORDER — SODIUM CHLORIDE 9 MG/ML
125 INJECTION, SOLUTION INTRAVENOUS CONTINUOUS
Status: DISCONTINUED | OUTPATIENT
Start: 2018-05-25 | End: 2018-05-29

## 2018-05-25 RX ORDER — HYDROMORPHONE HYDROCHLORIDE 2 MG/ML
0.5 INJECTION, SOLUTION INTRAMUSCULAR; INTRAVENOUS; SUBCUTANEOUS
Status: DISCONTINUED | OUTPATIENT
Start: 2018-05-25 | End: 2018-05-27

## 2018-05-25 RX ADMIN — LEVOFLOXACIN 750 MG: 5 INJECTION, SOLUTION INTRAVENOUS at 00:13

## 2018-05-25 RX ADMIN — LEVOFLOXACIN 500 MG: 5 INJECTION, SOLUTION INTRAVENOUS at 20:39

## 2018-05-25 RX ADMIN — SODIUM CHLORIDE 125 ML/HR: 900 INJECTION, SOLUTION INTRAVENOUS at 01:46

## 2018-05-25 RX ADMIN — Medication 10 ML: at 00:39

## 2018-05-25 RX ADMIN — HEPARIN SODIUM 5000 UNITS: 5000 INJECTION, SOLUTION INTRAVENOUS; SUBCUTANEOUS at 22:46

## 2018-05-25 RX ADMIN — Medication 10 ML: at 00:40

## 2018-05-25 RX ADMIN — Medication 10 ML: at 14:00

## 2018-05-25 RX ADMIN — MORPHINE SULFATE 2 MG: 4 INJECTION INTRAVENOUS at 04:28

## 2018-05-25 RX ADMIN — SODIUM CHLORIDE 125 ML/HR: 900 INJECTION, SOLUTION INTRAVENOUS at 09:43

## 2018-05-25 RX ADMIN — Medication 10 ML: at 22:00

## 2018-05-25 RX ADMIN — MORPHINE SULFATE 2 MG: 4 INJECTION INTRAVENOUS at 10:47

## 2018-05-25 RX ADMIN — HEPARIN SODIUM 5000 UNITS: 5000 INJECTION, SOLUTION INTRAVENOUS; SUBCUTANEOUS at 14:54

## 2018-05-25 RX ADMIN — MORPHINE SULFATE 2 MG: 4 INJECTION INTRAVENOUS at 00:38

## 2018-05-25 RX ADMIN — Medication 10 ML: at 06:02

## 2018-05-25 RX ADMIN — PANTOPRAZOLE SODIUM 40 MG: 40 TABLET, DELAYED RELEASE ORAL at 09:44

## 2018-05-25 RX ADMIN — SODIUM CHLORIDE 125 ML/HR: 900 INJECTION, SOLUTION INTRAVENOUS at 20:41

## 2018-05-25 RX ADMIN — HEPARIN SODIUM 5000 UNITS: 5000 INJECTION, SOLUTION INTRAVENOUS; SUBCUTANEOUS at 06:02

## 2018-05-25 RX ADMIN — METRONIDAZOLE 500 MG: 500 INJECTION, SOLUTION INTRAVENOUS at 09:43

## 2018-05-25 RX ADMIN — METRONIDAZOLE 500 MG: 500 INJECTION, SOLUTION INTRAVENOUS at 22:46

## 2018-05-25 RX ADMIN — INSULIN LISPRO 2 UNITS: 100 INJECTION, SOLUTION INTRAVENOUS; SUBCUTANEOUS at 16:30

## 2018-05-25 RX ADMIN — HYDROMORPHONE HYDROCHLORIDE 0.5 MG: 2 INJECTION INTRAMUSCULAR; INTRAVENOUS; SUBCUTANEOUS at 22:54

## 2018-05-25 NOTE — PROGRESS NOTES
Bedside shift change report given to Serge Lawrence (oncoming nurse) by Brandon Cowan (offgoing nurse). Report included the following information SBAR, Kardex, ED Summary, Procedure Summary, MAR and Recent Results.

## 2018-05-25 NOTE — ED NOTES
TRANSFER - OUT REPORT:    Verbal report given to Kimberlee Pineda RN on Melva Conti III  being transferred to Med/Surg for routine progression of care       Report consisted of patients Situation, Background, Assessment and   Recommendations(SBAR). Information from the following report(s) SBAR was reviewed with the receiving nurse. Lines:   Peripheral IV 05/24/18 Right Antecubital (Active)   Site Assessment Clean, dry, & intact 5/24/2018  7:02 PM   Phlebitis Assessment 0 5/24/2018  7:02 PM   Infiltration Assessment 0 5/24/2018  7:02 PM   Dressing Status Clean, dry, & intact 5/24/2018  7:02 PM   Dressing Type Transparent 5/24/2018  7:02 PM   Hub Color/Line Status Pink; Infusing;Flushed 5/24/2018  7:02 PM        Opportunity for questions and clarification was provided.       Patient transported with:   Registered Nurse

## 2018-05-25 NOTE — PROGRESS NOTES
Problem: Falls - Risk of  Goal: *Absence of Falls  Document Rashad Fall Risk and appropriate interventions in the flowsheet.    Outcome: Progressing Towards Goal  Fall Risk Interventions:            Medication Interventions: Patient to call before getting OOB

## 2018-05-25 NOTE — ED NOTES
Went to transport patient to floor. Found patient to be flushed and not feeling right. Patient reports that he reported this to pharmists and she accessed him. I asked her to come to bedside and reeval to see if it was progressing. Called Dr. Sen Petersen to notify him and he comes to bedside to eval him.

## 2018-05-25 NOTE — H&P
[facility]  [address] [facility] [address]  [phone]    Admission History and Physical      NAME:  Aleyda Carreon III   :   1975   MRN:  [id number]     PCP:  None     Date/Time:  2018         Subjective:     CHIEF COMPLAINT: abdominal pain      HISTORY OF PRESENT ILLNESS:     Mr. Peg Hatfield is a 38 y.o.  male who is admitted with colitis. Mr. Peg Hatfield with PMh fo HTN, gout preset venu to ER c/o abdominal pain, which is generalized and severe, which started this morning. The pain is sharp and diffuse . Denies vomiting or nasues. No fever, but felt chills. Past Medical History:   Diagnosis Date    Gout     Hypertension         Past Surgical History:   Procedure Laterality Date    HX WISDOM TEETH EXTRACTION         Social History   Substance Use Topics    Smoking status: Never Smoker    Smokeless tobacco: Never Used    Alcohol use 1.2 oz/week     2 Glasses of wine per week        Family History   Problem Relation Age of Onset    Hypertension Mother     Hypertension Father         No Known Allergies     Prior to Admission medications    Medication Sig Start Date End Date Taking? Authorizing Provider   omeprazole (PRILOSEC) 20 mg capsule Take 20 mg by mouth daily. Phys Other, MD   ibuprofen (MOTRIN) 600 mg tablet Take 1 Tab by mouth every six (6) hours as needed for Pain.  16   Bárbara Carrizales MD         Review of Systems:  (bold if positive, if negative)    Gen:  Eyes:  ENT:  CVS:  Pulm:  GI:  Abdominal pain,  :    MS:  Skin:  Psych:  Endo:    Hem:  Renal:    Neuro:            Objective:      VITALS:    Vital signs reviewed; most recent are:    Visit Vitals    /88    Pulse 86    Temp 98.9 °F (37.2 °C)    Resp 18    Ht 6' (1.829 m)    Wt 98.9 kg (218 lb)    SpO2 96%    BMI 29.57 kg/m2     SpO2 Readings from Last 6 Encounters:   18 96%   16 94%        No intake or output data in the 24 hours ending 18 7463 Exam:     Physical Exam:    Gen:  Well-developed, well-nourished, in no acute distress  HEENT:  Pink conjunctivae, PERRL, hearing intact to voice, moist mucous membranes  Neck:  Supple, without masses, thyroid non-tender  Resp:  No accessory muscle use, clear breath sounds without wheezes rales or rhonchi  Card:  No murmurs, normal S1, S2 without thrills, bruits or peripheral edema  Abd:   tender, non-distended, normoactive bowel sounds are present, no palpable organomegaly and no detectable hernias  Lymph:  No cervical or inguinal adenopathy  Musc:  No cyanosis or clubbing  Skin:  No rashes or ulcers, skin turgor is good  Neuro:  Cranial nerves are grossly intact, no focal motor weakness, follows commands appropriately  Psych:  Good insight, oriented to person, place and time, alert       Labs:    Recent Labs      05/24/18   1847   WBC  8.9   HGB  16.4   HCT  45.7   PLT  238     Recent Labs      05/24/18   1847   NA  134*   K  4.0   CL  98   CO2  27   GLU  216*   BUN  8   CREA  0.94   CA  9.2   ALB  3.8   TBILI  1.0   SGOT  89*   ALT  79*     No results found for: GLUCPOC  No results for input(s): PH, PCO2, PO2, HCO3, FIO2 in the last 72 hours. No results for input(s): INR in the last 72 hours. No lab exists for component: INREXT    Telemetry reviewed:         Assessment/Plan:    1. Colitis (5/24/2018). Start Levaquin and flagyl. Keep NPO, IVF. Pain management and consult GI.     2.  LT side Inguinal hernia (5/24/2018). consult general surgery. 3.  HTN (hypertension) (5/24/2018). Pt is not on BP meds. Start hydralazin PRN for now and may need PO meds on discharge. 4.  Hyperglycemia (5/24/2018). Pt denies Hx of DM. Check A1C     5. Transaminitis (5/24/2018)/ Hepatic steatosis (5/24/2018). Continue to monitor LFTs. GI to see    6. Hyponatremia (5/24/2018), mild . likely due to volume depletion. Continue IVF     7. Gout (5/24/2018). continue home med.        Previous medical records reviewed Risk of deterioration: high      Total time spent with patient: 79 [address] [facility] discussed with: Patient, Nursing Staff and >50% of time spent in counseling and coordination of care    Discussed:  Care Plan    Prophylaxis:  Lovenox    Probable Disposition:  Home w/Family           ___________________________________________________    Attending Physician: Yrn Sam MD

## 2018-05-25 NOTE — CDMP QUERY
1.    Please clarify if this patient is (was) being treated/managed for:     => Type 2 diabetes mellitus with hyperglycemia in the setting of A1C 8.3   => Other explanation of clinical findings   => Clinically Undetermined (no explanation for clinical findings)    The medical record reflects the following clinical findings, treatment, and risk factors. Risk Factors:  overweight    Clinical Indicators:  37 y/o male admitted for colitis. noted on admission glucose is 216 with repeat of 165, note visit of 2016 when glucose was 217. H and P notes \"hyperglycemia, pt denies DM, will check A 1 C), A1C this admission  is 8.3 . Please clarify and document your clinical opinion in the progress notes and discharge summary including the definitive and/or presumptive diagnosis, (suspected or probable), related to the above clinical findings. Please include clinical findings supporting your diagnosis. Thanks for your time.     Alanis Turcios RN, BSN  [phone]

## 2018-05-25 NOTE — CONSULTS
Surgery Consult    Subjective:      Mary Carmen Barrios III is a 38 y.o. male who I was asked to evaluate for a left inguinal hernia. He has known about this bulge in his left groin for about a year but did not seek any medical or surgical help. Recently it has gotten bigger and it causes discomfort when he strains or bends down. He denies any history of incarceration or previous surgery. He denies any symptoms on the right side. He has admitted with abd pain and a CT diagnosis of sigmoid colitis. He had a CT scan which showed this, a small inguinal hernia on left, gallstones and a Meckel's diverticulum.      Past Medical History:   Diagnosis Date    Gout     Hypertension      Past Surgical History:   Procedure Laterality Date    HX WISDOM TEETH EXTRACTION        Family History   Problem Relation Age of Onset    Hypertension Mother     Hypertension Father      Social History     Social History    Marital status:      Spouse name: N/A    Number of children: N/A    Years of education: N/A     Social History Main Topics    Smoking status: Never Smoker    Smokeless tobacco: Never Used    Alcohol use 1.2 oz/week     2 Glasses of wine per week    Drug use: No    Sexual activity: Not Asked     Other Topics Concern    None     Social History Narrative      Current Facility-Administered Medications   Medication Dose Route Frequency    pantoprazole (PROTONIX) tablet 40 mg  40 mg Oral DAILY    sodium chloride (NS) flush 5-10 mL  5-10 mL IntraVENous Q8H    sodium chloride (NS) flush 5-10 mL  5-10 mL IntraVENous PRN    levoFLOXacin (LEVAQUIN) 500 mg in D5W IVPB  500 mg IntraVENous Q24H    0.9% sodium chloride infusion  125 mL/hr IntraVENous CONTINUOUS    heparin (porcine) injection 5,000 Units  5,000 Units SubCUTAneous Q8H    metroNIDAZOLE (FLAGYL) IVPB premix 500 mg  500 mg IntraVENous Q12H    HYDROmorphone (DILAUDID) injection 0.5 mg  0.5 mg IntraVENous Q3H PRN    hydrALAZINE (APRESOLINE) 20 mg/mL injection 10 mg  10 mg IntraVENous Q6H PRN      No Known Allergies    Review of Systems:REVIEW OF SYSTEMS:     []     Unable to obtain  ROS due to  []    mental status change  []    sedated   []    intubated   [x]    Total of 12 systems reviewed as follows:  Constitutional: negative fever, negative chills, negative weight loss  Eyes:   negative visual changes  ENT:   negative sore throat, tongue or lip swelling  Respiratory:  negative cough, negative dyspnea  Cards:  negative for chest pain, palpitations, lower extremity edema  GI:   negative for nausea, vomiting, diarrhea, positive for abdominal pain  :  negative for frequency, dysuria  Integument:  negative for rash and pruritus  Heme:  negative for easy bruising and gum/nose bleeding  Musculoskel: negative for myalgias,  back pain and muscle weakness  Neuro:  negative for headaches, dizziness, vertigo  Psych:  negative for feelings of anxiety, depression     Objective:      Patient Vitals for the past 8 hrs:   BP Temp Pulse SpO2   18 0755 145/80 99.1 °F (37.3 °C) 91 95 %       Temp (24hrs), Av.8 °F (37.1 °C), Min:98.6 °F (37 °C), Max:99.1 °F (37.3 °C)      Physical Exam:  General:  Alert, cooperative, no distress, appears stated age. Eyes:  Conjunctivae/corneas clear. PERRL, EOMs intact. Nose: Nares normal. Septum midline. Mucosa normal. No drainage or sinus tenderness. Mouth/Throat: Lips, mucosa, and tongue normal. Teeth and gums normal.   Neck: Supple, symmetrical, trachea midline, no adenopathy, thyroid: no enlargment/tenderness/nodules, no carotid bruit and no JVD. Back:   Symmetric, no curvature. ROM normal. No CVA tenderness. Lungs:   Clear to auscultation bilaterally. Heart:  Regular rate and rhythm, S1, S2 normal, no murmur, click, rub or gallop. Abdomen:   Soft, non-tender. Bowel sounds normal. No masses,  No organomegaly. Reducible left inguinal hernia - non tender.     Extremities: Extremities normal, atraumatic, no cyanosis or edema. Pulses: 2+ and symmetric all extremities. Skin: Skin color, texture, turgor normal. No rashes or lesions   Lymph nodes: Cervical, supraclavicular, and axillary nodes normal.     Labs: Recent Labs      05/25/18   0306   WBC  10.0   HGB  14.3   HCT  40.7   PLT  210     Recent Labs      05/25/18   0306   NA  135*   K  3.9   CL  100   CO2  24   GLU  165*   BUN  9   CREA  0.82   CA  8.4*   ALB  3.1*   TBILI  0.7   SGOT  67*   ALT  65     No results for input(s): INR in the last 72 hours. No lab exists for component: INREXT      Assessment:     Left inguinal hernia  Meckel's diverticulum    Plan:     I discussed with him my current findings specifically with regard to his hernia. I agree that he will need repair of his hernia since it is symptomatic and is getting worse. This should be done on an elective basis when he has recovered from his current episode of colitis. Dr Camila Small, GI, contacted me and suggested that I should resect his Meckel's diverticulum also. I explained to the patient that he should make a FU appointment with me and I will discuss these options with him.     Signed By: Glenn Mace MD     May 25, 2018

## 2018-05-25 NOTE — CONSULTS
[address] [place] [address]. [address] [place] [address] NP  [phone]                    GASTROENTEROLOGY CONSULTATION NOTE              NAME:  Carina Reyes III   :   1975   MRN:   [id number]       Referring Physician:   Dr. Marie Fields Date:   2018 5:27 PM    Chief Complaint:    Abdominal Pain     History of Present Illness:    Patient is a 38 y.o. male who we were asked to see in consultation at the request of Dr. Tamy Wright for thee above complaint. The patient presented to the ER for periumbilical abdominal pain that had an acute onset yesterday morning. He describes it as burning  And then became \"intense\". It has no aggravating or relieving factors. He has had associated diarrhea for the past two days, no black or bloody stools He denies nausea and vomiting, denies fevers. PMH:  Past Medical History:   Diagnosis Date    Gout     Hypertension        PSH:  Past Surgical History:   Procedure Laterality Date    HX WISDOM TEETH EXTRACTION         Allergies:  No Known Allergies    Home Medications:  Prior to Admission Medications   Prescriptions Last Dose Informant Patient Reported? Taking?   colchicine 0.6 mg tablet 2018 at AM Self Yes Yes   Sig: Take 0.6 mg by mouth daily. omeprazole (PRILOSEC) 20 mg capsule 2018 at AM Self Yes Yes   Sig: Take 20 mg by mouth daily.       Facility-Administered Medications: None       Hospital Medications:  Current Facility-Administered Medications   Medication Dose Route Frequency    pantoprazole (PROTONIX) tablet 40 mg  40 mg Oral DAILY    sodium chloride (NS) flush 5-10 mL  5-10 mL IntraVENous Q8H    sodium chloride (NS) flush 5-10 mL  5-10 mL IntraVENous PRN    levoFLOXacin (LEVAQUIN) 500 mg in D5W IVPB  500 mg IntraVENous Q24H    0.9% sodium chloride infusion  125 mL/hr IntraVENous CONTINUOUS    heparin (porcine) injection 5,000 Units  5,000 Units SubCUTAneous Q8H    metroNIDAZOLE (FLAGYL) IVPB premix 500 mg  500 mg IntraVENous Q12H    HYDROmorphone (DILAUDID) injection 0.5 mg  0.5 mg IntraVENous Q3H PRN    glucose chewable tablet 16 g  4 Tab Oral PRN    dextrose (D50W) injection syrg 12.5-25 g  12.5-25 g IntraVENous PRN    glucagon (GLUCAGEN) injection 1 mg  1 mg IntraMUSCular PRN    insulin lispro (HUMALOG) injection   SubCUTAneous AC&HS    hydrALAZINE (APRESOLINE) 20 mg/mL injection 10 mg  10 mg IntraVENous Q6H PRN       Social History:  Social History   Substance Use Topics    Smoking status: Never Smoker    Smokeless tobacco: Never Used    Alcohol use 1.2 oz/week     2 Glasses of wine per week       Family History:  Family History   Problem Relation Age of Onset    Hypertension Mother     Hypertension Father        Review of Systems:  Constitutional: negative fever, negative chills, negative weight loss  Eyes:   negative visual changes  ENT:   negative sore throat, tongue or lip swelling  Respiratory:  negative cough, negative dyspnea  Cards:  negative for chest pain, palpitations, lower extremity edema  GI:   See HPI  :  negative for frequency, dysuria  Integument:  negative for rash and pruritus  Heme:  negative for easy bruising and gum/nose bleeding  Musculoskel: negative for myalgias,  back pain and muscle weakness  Neuro: negative for headaches, dizziness, vertigo  Psych:  negative for feelings of anxiety, depression     Objective:   Patient Vitals for the past 8 hrs:   BP Temp Pulse Resp SpO2   05/25/18 1534 166/88 98.6 °F (37 °C) (!) 110 18 96 %   05/25/18 1341 153/79 100 °F (37.8 °C) 100 17 98 %             EXAM:     NEURO-alert, oriented x3, affect appropriate, no focal neurological deficits   HEENT-Head: Normocephalic, no lesions, without obvious abnormality. LUNGS-clear to auscultation bilaterally    COR-regular rate and rhythym     ABD- soft, tender to palpation.  Bowel sounds normal. No masses,  no organomegaly     EXT-no edema    Skin - No rash     Data Review     Recent Labs      05/25/18   0306  05/24/18   1847 WBC  10.0  8.9   HGB  14.3  16.4   HCT  40.7  45.7   PLT  210  238     Recent Labs      05/25/18   0306  05/24/18   1847   NA  135*  134*   K  3.9  4.0   CL  100  98   CO2  24  27   BUN  9  8   CREA  0.82  0.94   GLU  165*  216*   CA  8.4*  9.2     Recent Labs      05/25/18   0306  05/24/18   1847   SGOT  67*  89*   AP  57  73   TP  6.8  8.3*   ALB  3.1*  3.8   GLOB  3.7  4.5*   LPSE   --   194     No results for input(s): INR, PTP, APTT in the last 72 hours. No lab exists for component: INREXT    Patient Active Problem List   Diagnosis Code    Colitis K52.9    HTN (hypertension) I10    Gout M10.9    Hyperglycemia R73.9    Transaminitis R74.0    Inguinal hernia K40.90    Hyponatremia E87.1    Hepatic steatosis K76.0    Diverticulitis K57.92       Assessment and Plan:  Abdominal Pain:  CT scan shows \"1. Trace amount of stranding surrounding the junction of the descending and sigmoid colon which may represent a minimal degree of colitis. There is a left inguinal hernia which partially contains the junction of the descending and sigmoid colon. 2. Likely Meckel's diverticulum in the right lower quadrant. \"    - Clear liquid diet today. - Continue IVF  - Continue IV ABX  - Stool Studies have been ordered. WIll follow the results. - Supportive Care. Will continue to follow. Thanks for allowing me to participate in the care of this patient.   Signed By: Keiry Gayle NP     5/25/2018  5:27 PM

## 2018-05-25 NOTE — PROGRESS NOTES
5/25/2018  8:45 AM  Case Management Note  Met with patient to discuss discharge planning. Confirmed charted demographics correct. Patient lives with spouse in a 2 story home with 3 steps to enter and 15 within. Patient does not utilize any medical equipment or therapies  Patient use [facility] in [place], [place] [address], coverage through [place]  Patient does not have a PCP but provided a list of physicians. Patient wife can transport on discharge. Will continue to follow    Reason for Admission:   Colitis, diverticulitis                    RRAT Score:          4           Plan for utilizing home health:      Not at this time                    Likelihood of Readmission:  Low/green                         Transition of Care Plan:           Home with family assistance. Care Management Interventions  PCP Verified by CM:  Yes  Mode of Transport at Discharge: Self  Transition of Care Consult (CM Consult): Discharge Planning  Physical Therapy Consult: No  Occupational Therapy Consult: No  Speech Therapy Consult: No  Current Support Network: Lives with Spouse  Confirm Follow Up Transport: Family  Plan discussed with Pt/Family/Caregiver: Yes  Discharge Location  Discharge Placement: Home with family assistance  Hetal Tiwari

## 2018-05-25 NOTE — PROGRESS NOTES
[facility]: MED RECONCILIATION    Comments/Recommendations:   · Verified allergies as documented: NKA  · Med rec completed with patient. He reports using a sinus medication over the past two days  · Pharmacy updated to [facility] on [address] [place] [address]  · During med rec, patient complained of flushed and lightheadedness after start of flagyl infusion, RN was made aware. And later RN informed MD, who will monitor patient for same with second infusion and determine if therapy needs to be adjusted. Medications added:     · Colchicine - he takes 2-3 per day with acute gout flare, he has not had a flare up recently    Medications removed:    · Ibuprofen    Medications adjusted:    · None    Information obtained from: Patient    Allergies: Review of patient's allergies indicates no known allergies. Prior to Admission Medications:   Prior to Admission Medications   Prescriptions Last Dose Informant Patient Reported? Taking?   colchicine 0.6 mg tablet 5/23/2018 at AM Self Yes Yes   Sig: Take 0.6 mg by mouth daily. omeprazole (PRILOSEC) 20 mg capsule 5/24/2018 at AM Self Yes Yes   Sig: Take 20 mg by mouth daily.       Facility-Administered Medications: None         Thank you,  Lauren Burch, PharmD     Contact: [phone]

## 2018-05-25 NOTE — PROGRESS NOTES
TRANSFER - IN REPORT:    Verbal report received from Benjy(name) on Aleyda Carreon III  being received from ED(unit) for routine progression of care      Report consisted of patients Situation, Background, Assessment and   Recommendations(SBAR). Information from the following report(s) SBAR, Kardex, ED Summary, Procedure Summary, MAR and Recent Results was reviewed with the receiving nurse. Opportunity for questions and clarification was provided. Assessment completed upon patients arrival to unit and care assumed.

## 2018-05-25 NOTE — PROGRESS NOTES
[facility]  [address] [facility], [address] [place] [address]  [phone]      Medical Progress Note      NAME: Nurys Greenberg III   :  1975  MRM:  [id number]    Date/Time: 2018  3:47 PM       Assessment and Plan:     1. Colitis (2018). Continue Levaquin and flagyl. Keep NPO, IVF. Pain management. GI and General Surgery.     2. LT side Inguinal hernia / Meckle's Diverticulum. General Surgery consult appreciate      3. HTN (hypertension) (2018). Pt is not on BP meds. Cont hydralazine PRN. Needs PO meds      4. Hyperglycemia (2018) c/w new onset diabetes mellitus type 2. Pt denies Hx of DM but A1c is 8.3. Will start SSI and FSBG     5.  Transaminitis / Hepatic steatosis (2018). Continue to monitor LFTs. Following with GI     6. Hyponatremia (2018), mild. Likely due to volume depletion. Continue IVF      7.  Gout (2018). continue home med. Subjective:     Chief Complaint:  F/u abd pain, colitis    ROS:  (bold if positive, if negative)    Abd PainNausea          Objective:     Last 24hrs VS reviewed since prior progress note.  Most recent are:    Visit Vitals    /88 (BP 1 Location: Right arm, BP Patient Position: At rest)    Pulse (!) 110    Temp 98.6 °F (37 °C)    Resp 18    Ht 6' (1.829 m)    Wt 98.9 kg (218 lb)    SpO2 96%    BMI 29.57 kg/m2     SpO2 Readings from Last 6 Encounters:   18 96%   16 94%        No intake or output data in the 24 hours ending 18 0818     Physical Exam:    Gen:  Well-developed, well-nourished, in no acute distress  HEENT:  Pink conjunctivae, PERRL, hearing intact to voice, moist mucous membranes  Neck:  Supple, without masses, thyroid non-tender  Resp:  No accessory muscle use, clear breath sounds without wheezes rales or rhonchi  Card:  No murmurs, normal S1, S2 without thrills, bruits or peripheral edema  Abd:  Soft, mildly ttp in all 4 quads, non-distended, normoactive bowel sounds are present, no palpable organomegaly and no detectable hernias  Lymph:  No cervical or inguinal adenopathy  Musc:  No cyanosis or clubbing  Skin:  No rashes or ulcers, skin turgor is good  Neuro:  Cranial nerves are grossly intact, no focal motor weakness, follows commands appropriately  Psych:  Good insight, oriented to person, place and time, alert    Telemetry reviewed:   normal sinus rhythm  __________________________________________________________________  Medications Reviewed: (see below)  Medications:     Current Facility-Administered Medications   Medication Dose Route Frequency    pantoprazole (PROTONIX) tablet 40 mg  40 mg Oral DAILY    sodium chloride (NS) flush 5-10 mL  5-10 mL IntraVENous Q8H    sodium chloride (NS) flush 5-10 mL  5-10 mL IntraVENous PRN    levoFLOXacin (LEVAQUIN) 500 mg in D5W IVPB  500 mg IntraVENous Q24H    0.9% sodium chloride infusion  125 mL/hr IntraVENous CONTINUOUS    heparin (porcine) injection 5,000 Units  5,000 Units SubCUTAneous Q8H    metroNIDAZOLE (FLAGYL) IVPB premix 500 mg  500 mg IntraVENous Q12H    HYDROmorphone (DILAUDID) injection 0.5 mg  0.5 mg IntraVENous Q3H PRN    hydrALAZINE (APRESOLINE) 20 mg/mL injection 10 mg  10 mg IntraVENous Q6H PRN        Lab Data Reviewed: (see below)  Lab Review:     Recent Labs      05/25/18   0306  05/24/18   1847   WBC  10.0  8.9   HGB  14.3  16.4   HCT  40.7  45.7   PLT  210  238     Recent Labs      05/25/18   0306  05/24/18   1847   NA  135*  134*   K  3.9  4.0   CL  100  98   CO2  24  27   GLU  165*  216*   BUN  9  8   CREA  0.82  0.94   CA  8.4*  9.2   ALB  3.1*  3.8   TBILI  0.7  1.0   SGOT  67*  89*   ALT  65  79*     No results found for: GLUCPOC  No results for input(s): PH, PCO2, PO2, HCO3, FIO2 in the last 72 hours. No results for input(s): INR in the last 72 hours.     No lab exists for component: INREXT  All Micro Results     Procedure Component Value Units Date/Time    URINE CULTURE HOLD SAMPLE [[id number]] Collected:  05/24/18 2149    Order Status:  Completed Specimen:  Urine from Serum Updated:  05/24/18 2157     Urine culture hold         URINE ON HOLD IN MICROBIOLOGY DEPT FOR 3 DAYS. IF UNPRESERVED URINE IS SUBMITTED, IT CANNOT BE USED FOR ADDITIONAL TESTING AFTER 24 HRS, RECOLLECTION WILL BE REQUIRED. I have reviewed notes of prior 24hr.     Other pertinent lab: None    Total time spent with patient: 30 895 [facility] discussed with: Patient, Care Manager, Consultant/Specialist and >50% of time spent in counseling and coordination of care    Discussed:  Care Plan and D/C Planning    Prophylaxis:  Lovenox    Disposition:  Home w/Family           ___________________________________________________    Attending Physician: Jayce Sun DO

## 2018-05-25 NOTE — PROGRESS NOTES
Pharmacy changed metronidazole to 500 mg IV q12H, per protocol. Treating colitis, along with Levaquin. Pharmacy will follow daily.

## 2018-05-25 NOTE — PROGRESS NOTES
Bedside and Verbal shift change report given to Dieter Dickson RN (oncoming nurse) by Paco Watkins RN (offgoing nurse). Report included the following information SBAR, Kardex, Intake/Output, MAR, Accordion and Recent Results.

## 2018-05-26 LAB
ALBUMIN SERPL-MCNC: 3 G/DL (ref 3.5–5)
ALBUMIN/GLOB SERPL: 0.8 {RATIO} (ref 1.1–2.2)
ALP SERPL-CCNC: 56 U/L (ref 45–117)
ALT SERPL-CCNC: 55 U/L (ref 12–78)
ANION GAP SERPL CALC-SCNC: 10 MMOL/L (ref 5–15)
AST SERPL-CCNC: 41 U/L (ref 15–37)
BASOPHILS # BLD: 0.1 K/UL (ref 0–0.1)
BASOPHILS NFR BLD: 0 % (ref 0–1)
BILIRUB DIRECT SERPL-MCNC: 0.6 MG/DL (ref 0–0.2)
BILIRUB SERPL-MCNC: 1.4 MG/DL (ref 0.2–1)
BUN SERPL-MCNC: 6 MG/DL (ref 6–20)
BUN/CREAT SERPL: 7 (ref 12–20)
CALCIUM SERPL-MCNC: 8.3 MG/DL (ref 8.5–10.1)
CHLORIDE SERPL-SCNC: 99 MMOL/L (ref 97–108)
CO2 SERPL-SCNC: 25 MMOL/L (ref 21–32)
CREAT SERPL-MCNC: 0.85 MG/DL (ref 0.7–1.3)
DIFFERENTIAL METHOD BLD: ABNORMAL
EOSINOPHIL # BLD: 0 K/UL (ref 0–0.4)
EOSINOPHIL NFR BLD: 0 % (ref 0–7)
ERYTHROCYTE [DISTWIDTH] IN BLOOD BY AUTOMATED COUNT: 11.8 % (ref 11.5–14.5)
GLOBULIN SER CALC-MCNC: 3.6 G/DL (ref 2–4)
GLUCOSE BLD STRIP.AUTO-MCNC: 170 MG/DL (ref 65–100)
GLUCOSE BLD STRIP.AUTO-MCNC: 189 MG/DL (ref 65–100)
GLUCOSE BLD STRIP.AUTO-MCNC: 211 MG/DL (ref 65–100)
GLUCOSE BLD STRIP.AUTO-MCNC: 215 MG/DL (ref 65–100)
GLUCOSE SERPL-MCNC: 157 MG/DL (ref 65–100)
HCT VFR BLD AUTO: 41.2 % (ref 36.6–50.3)
HGB BLD-MCNC: 14.1 G/DL (ref 12.1–17)
IMM GRANULOCYTES # BLD: 0.1 K/UL (ref 0–0.04)
IMM GRANULOCYTES NFR BLD AUTO: 1 % (ref 0–0.5)
LYMPHOCYTES # BLD: 1.8 K/UL (ref 0.8–3.5)
LYMPHOCYTES NFR BLD: 13 % (ref 12–49)
MAGNESIUM SERPL-MCNC: 1.5 MG/DL (ref 1.6–2.4)
MCH RBC QN AUTO: 31.8 PG (ref 26–34)
MCHC RBC AUTO-ENTMCNC: 34.2 G/DL (ref 30–36.5)
MCV RBC AUTO: 93 FL (ref 80–99)
MONOCYTES # BLD: 1.5 K/UL (ref 0–1)
MONOCYTES NFR BLD: 11 % (ref 5–13)
NEUTS SEG # BLD: 10.5 K/UL (ref 1.8–8)
NEUTS SEG NFR BLD: 75 % (ref 32–75)
NRBC # BLD: 0 K/UL (ref 0–0.01)
NRBC BLD-RTO: 0 PER 100 WBC
PHOSPHATE SERPL-MCNC: 3.5 MG/DL (ref 2.6–4.7)
PLATELET # BLD AUTO: 205 K/UL (ref 150–400)
PMV BLD AUTO: 9.2 FL (ref 8.9–12.9)
POTASSIUM SERPL-SCNC: 3.8 MMOL/L (ref 3.5–5.1)
PROT SERPL-MCNC: 6.6 G/DL (ref 6.4–8.2)
RBC # BLD AUTO: 4.43 M/UL (ref 4.1–5.7)
SERVICE CMNT-IMP: ABNORMAL
SODIUM SERPL-SCNC: 134 MMOL/L (ref 136–145)
WBC # BLD AUTO: 14 K/UL (ref 4.1–11.1)

## 2018-05-26 PROCEDURE — 74011636637 HC RX REV CODE- 636/637: Performed by: INTERNAL MEDICINE

## 2018-05-26 PROCEDURE — 74011250637 HC RX REV CODE- 250/637: Performed by: INTERNAL MEDICINE

## 2018-05-26 PROCEDURE — 85025 COMPLETE CBC W/AUTO DIFF WBC: CPT | Performed by: INTERNAL MEDICINE

## 2018-05-26 PROCEDURE — 82962 GLUCOSE BLOOD TEST: CPT

## 2018-05-26 PROCEDURE — 74011000250 HC RX REV CODE- 250: Performed by: INTERNAL MEDICINE

## 2018-05-26 PROCEDURE — 74011250636 HC RX REV CODE- 250/636: Performed by: INTERNAL MEDICINE

## 2018-05-26 PROCEDURE — 84100 ASSAY OF PHOSPHORUS: CPT | Performed by: INTERNAL MEDICINE

## 2018-05-26 PROCEDURE — 80076 HEPATIC FUNCTION PANEL: CPT | Performed by: INTERNAL MEDICINE

## 2018-05-26 PROCEDURE — 83735 ASSAY OF MAGNESIUM: CPT | Performed by: INTERNAL MEDICINE

## 2018-05-26 PROCEDURE — 80048 BASIC METABOLIC PNL TOTAL CA: CPT | Performed by: INTERNAL MEDICINE

## 2018-05-26 PROCEDURE — 65270000029 HC RM PRIVATE

## 2018-05-26 RX ORDER — OXYCODONE AND ACETAMINOPHEN 5; 325 MG/1; MG/1
1 TABLET ORAL
Status: DISCONTINUED | OUTPATIENT
Start: 2018-05-26 | End: 2018-05-27

## 2018-05-26 RX ADMIN — Medication 10 ML: at 21:52

## 2018-05-26 RX ADMIN — HEPARIN SODIUM 5000 UNITS: 5000 INJECTION, SOLUTION INTRAVENOUS; SUBCUTANEOUS at 06:24

## 2018-05-26 RX ADMIN — INSULIN LISPRO 2 UNITS: 100 INJECTION, SOLUTION INTRAVENOUS; SUBCUTANEOUS at 13:43

## 2018-05-26 RX ADMIN — Medication 10 ML: at 13:53

## 2018-05-26 RX ADMIN — SODIUM CHLORIDE 125 ML/HR: 900 INJECTION, SOLUTION INTRAVENOUS at 06:24

## 2018-05-26 RX ADMIN — Medication 10 ML: at 06:25

## 2018-05-26 RX ADMIN — INSULIN LISPRO 2 UNITS: 100 INJECTION, SOLUTION INTRAVENOUS; SUBCUTANEOUS at 17:42

## 2018-05-26 RX ADMIN — PANTOPRAZOLE SODIUM 40 MG: 40 TABLET, DELAYED RELEASE ORAL at 09:45

## 2018-05-26 RX ADMIN — OXYCODONE HYDROCHLORIDE AND ACETAMINOPHEN 1 TABLET: 5; 325 TABLET ORAL at 23:04

## 2018-05-26 RX ADMIN — HEPARIN SODIUM 5000 UNITS: 5000 INJECTION, SOLUTION INTRAVENOUS; SUBCUTANEOUS at 21:52

## 2018-05-26 RX ADMIN — HYDROMORPHONE HYDROCHLORIDE 0.5 MG: 2 INJECTION INTRAMUSCULAR; INTRAVENOUS; SUBCUTANEOUS at 20:08

## 2018-05-26 RX ADMIN — METRONIDAZOLE 500 MG: 500 INJECTION, SOLUTION INTRAVENOUS at 09:45

## 2018-05-26 RX ADMIN — HYDROMORPHONE HYDROCHLORIDE 0.5 MG: 2 INJECTION INTRAMUSCULAR; INTRAVENOUS; SUBCUTANEOUS at 04:41

## 2018-05-26 RX ADMIN — HYDROMORPHONE HYDROCHLORIDE 0.5 MG: 2 INJECTION INTRAMUSCULAR; INTRAVENOUS; SUBCUTANEOUS at 13:52

## 2018-05-26 RX ADMIN — LEVOFLOXACIN 500 MG: 5 INJECTION, SOLUTION INTRAVENOUS at 21:51

## 2018-05-26 RX ADMIN — HYDROMORPHONE HYDROCHLORIDE 0.5 MG: 2 INJECTION INTRAMUSCULAR; INTRAVENOUS; SUBCUTANEOUS at 17:48

## 2018-05-26 RX ADMIN — HEPARIN SODIUM 5000 UNITS: 5000 INJECTION, SOLUTION INTRAVENOUS; SUBCUTANEOUS at 13:43

## 2018-05-26 RX ADMIN — METRONIDAZOLE 500 MG: 500 INJECTION, SOLUTION INTRAVENOUS at 21:52

## 2018-05-26 RX ADMIN — SODIUM CHLORIDE 125 ML/HR: 900 INJECTION, SOLUTION INTRAVENOUS at 17:48

## 2018-05-26 NOTE — PROGRESS NOTES
[facility] [address]  [address] [facility], [address] [place] [address]  [phone]      Medical Progress Note      NAME: Shyam Mcgee III   :  1975  MRM:  [id number]    Date/Time: 2018  3:47 PM       Assessment and Plan:     1. Colitis (2018). Pain still significant and WBC jumped, Will continue IV Levaquin and flagyl through today before transitioning to PO. Continue clears for now and possibly transition to full liquid this afternoon if pain decreases. Continue IVF, pain management. Appreciated GI and gen surg input.     2. LT side Inguinal hernia / Meckle's Diverticulum. General Surgery consult appreciated. Will follow-up outpatient for elective surgery.     3. HTN (hypertension) (2018). Pt is not on BP meds. Cont hydralazine PRN. Needs PO meds on discharge      4. Hyperglycemia (2018) c/w new onset diabetes mellitus type 2. Pt denies Hx of DM but A1c is 8.3. Will start SSI and FSBG. Will need Rx for meter, strips, lancets, and metformin on discharge. Addressed importance of PCP establishment with patient, has list at bedside.     5. Transaminitis / Hepatic steatosis (2018). Continue to monitor LFTs.     6. Hyponatremia (2018), mild. Likely due to volume depletion. Continue IVF      7.  Gout (2018). continue home med. Subjective:     Chief Complaint:  F/u abd pain, colitis. Patient seen and examined. Chart reviewed. Patient continues abd pain and nausea albeit less than yesterday. ROS:  (bold if positive, if negative)    Abd PainNausea          Objective:     Last 24hrs VS reviewed since prior progress note.  Most recent are:    Visit Vitals    /79 (BP 1 Location: Right arm, BP Patient Position: At rest)    Pulse 93    Temp 98.5 °F (36.9 °C)    Resp 18    Ht 6' (1.829 m)    Wt 98.9 kg (218 lb)    SpO2 96%    BMI 29.57 kg/m2     SpO2 Readings from Last 6 Encounters:   18 96%   16 94%            Intake/Output Summary (Last 24 hours) at 05/26/18 0839  Last data filed at 05/26/18 7697   Gross per 24 hour   Intake             3333 ml   Output                0 ml   Net             3333 ml        Physical Exam:    Gen:  Well-developed, well-nourished, in no acute distress  HEENT:  Pink conjunctivae, PERRL, hearing intact to voice, moist mucous membranes  Neck:  Supple, without masses, thyroid non-tender  Resp:  No accessory muscle use, clear breath sounds without wheezes rales or rhonchi  Card:  No murmurs, normal S1, S2 without thrills, bruits or peripheral edema  Abd:  Soft, mildly ttp in all 4 quads, non-distended, normoactive bowel sounds are present, no palpable organomegaly and no detectable hernias  Lymph:  No cervical or inguinal adenopathy  Musc:  No cyanosis or clubbing  Skin:  No rashes or ulcers, skin turgor is good  Neuro:  Cranial nerves are grossly intact, no focal motor weakness, follows commands appropriately  Psych:  Good insight, oriented to person, place and time, alert    Telemetry reviewed:   normal sinus rhythm  __________________________________________________________________  Medications Reviewed: (see below)  Medications:     Current Facility-Administered Medications   Medication Dose Route Frequency    pantoprazole (PROTONIX) tablet 40 mg  40 mg Oral DAILY    sodium chloride (NS) flush 5-10 mL  5-10 mL IntraVENous Q8H    sodium chloride (NS) flush 5-10 mL  5-10 mL IntraVENous PRN    levoFLOXacin (LEVAQUIN) 500 mg in D5W IVPB  500 mg IntraVENous Q24H    0.9% sodium chloride infusion  125 mL/hr IntraVENous CONTINUOUS    heparin (porcine) injection 5,000 Units  5,000 Units SubCUTAneous Q8H    metroNIDAZOLE (FLAGYL) IVPB premix 500 mg  500 mg IntraVENous Q12H    HYDROmorphone (DILAUDID) injection 0.5 mg  0.5 mg IntraVENous Q3H PRN    glucose chewable tablet 16 g  4 Tab Oral PRN    dextrose (D50W) injection syrg 12.5-25 g  12.5-25 g IntraVENous PRN    glucagon (GLUCAGEN) injection 1 mg  1 mg IntraMUSCular PRN    insulin lispro (HUMALOG) injection   SubCUTAneous AC&HS    hydrALAZINE (APRESOLINE) 20 mg/mL injection 10 mg  10 mg IntraVENous Q6H PRN        Lab Data Reviewed: (see below)  Lab Review:     Recent Labs      05/26/18 0448 05/25/18 0306 05/24/18 1847   WBC  14.0*  10.0  8.9   HGB  14.1  14.3  16.4   HCT  41.2  40.7  45.7   PLT  205  210  238     Recent Labs      05/26/18 0448 05/25/18   0306  05/24/18   1847   NA  134*  135*  134*   K  3.8  3.9  4.0   CL  99  100  98   CO2  25  24  27   GLU  157*  165*  216*   BUN  6  9  8   CREA  0.85  0.82  0.94   CA  8.3*  8.4*  9.2   MG  1.5*   --    --    PHOS  3.5   --    --    ALB  3.0*  3.1*  3.8   TBILI  1.4*  0.7  1.0   SGOT  41*  67*  89*   ALT  55  65  79*     Lab Results   Component Value Date/Time    Glucose (POC) 189 (H) 05/26/2018 07:12 AM    Glucose (POC) 174 (H) 05/25/2018 09:10 PM    Glucose (POC) 224 (H) 05/25/2018 04:58 PM     No results for input(s): PH, PCO2, PO2, HCO3, FIO2 in the last 72 hours. No results for input(s): INR in the last 72 hours. No lab exists for component: Leora Landau  All Micro Results     Procedure Component Value Units Date/Time    CULTURE, STOOL [[id number]]     Order Status:  Sent Specimen:  Stool     OVA & PARASITES, STOOL [[id number]]     Order Status:  Sent Specimen:  Stool from Stool     C. DIFFICILE (DNA) [[id number]]     Order Status:  Sent     URINE CULTURE HOLD SAMPLE [[id number]] Collected:  05/24/18 2149    Order Status:  Completed Specimen:  Urine from Serum Updated:  05/24/18 2157     Urine culture hold         URINE ON HOLD IN MICROBIOLOGY DEPT FOR 3 DAYS. IF UNPRESERVED URINE IS SUBMITTED, IT CANNOT BE USED FOR ADDITIONAL TESTING AFTER 24 HRS, RECOLLECTION WILL BE REQUIRED. I have reviewed notes of prior 24hr.     Other pertinent lab: None    Total time spent with patient: 30 895 [address] discussed with: Patient and Nursing Staff    Discussed:  Care Plan and D/C Planning    Prophylaxis:  Lovenox    Disposition:  Home w/Family           ___________________________________________________    Attending Physician: [id number] [facility], DO

## 2018-05-26 NOTE — PROGRESS NOTES
Bedside and Verbal shift change report given to [address] (oncoming nurse) by Candace Verdugo (offgoing nurse). Report included the following information SBAR, Kardex, Intake/Output, MAR, Accordion, Recent Results and Med Rec Status.

## 2018-05-26 NOTE — PROGRESS NOTES
Bedside and Verbal shift change report given to [address] rn  (oncoming nurse) by Octaviano Craven  (offgoing nurse). Report included the following information SBAR and Kardex.

## 2018-05-26 NOTE — PROGRESS NOTES
Ray Holden M.D.  [phone]           GI PROGRESS NOTE        NAME: Marielos Moore III   :  1975   MRN:  [id number]       Subjective:   Reports feeling less pain this afternoon, more knee pain from his gout. Had one bowel movement of loose stools, no blood. Objective: In NAD      VITALS:   Last 24hrs VS reviewed since prior progress note. Most recent are:  Visit Vitals    /78 (BP 1 Location: Right arm, BP Patient Position: At rest)    Pulse 95    Temp 100.1 °F (37.8 °C)    Resp 18    Ht 6' (1.829 m)    Wt 98.9 kg (218 lb)    SpO2 95%    BMI 29.57 kg/m2       Intake/Output Summary (Last 24 hours) at 18 1518  Last data filed at 18 0754   Gross per 24 hour   Intake             3653 ml   Output                0 ml   Net             3653 ml       PHYSICAL EXAM:  General: Alert, in no acute distress    HEENT: Anicteric sclerae. Lungs:            CTA Bilaterally. Heart:  Regular  rhythm,    Abdomen: Soft, mildly distended and tender over LLQ  (+)Bowel sounds, no HSM  Extremities: No c/c/e  Neurologic:  CN 2-12 gi, Alert and oriented X 3. No acute neurological distress   Psych:   Good insight. Not anxious nor agitated.     Lab Data Reviewed:   Recent Labs      18   0448  18   0306   WBC  14.0*  10.0   HGB  14.1  14.3   HCT  41.2  40.7   PLT  205  210     Recent Labs      18   0448  18   0306   NA  134*  135*   K  3.8  3.9   CL  99  100   CO2  25  24   BUN  6  9   CREA  0.85  0.82   GLU  157*  165*   PHOS  3.5   --    CA  8.3*  8.4*     Recent Labs      18   0448  18   0306  18   1847   SGOT  41*  67*  89*   AP  56  57  73   TP  6.6  6.8  8.3*   ALB  3.0*  3.1*  3.8   GLOB  3.6  3.7  4.5*   LPSE   --    --   194       ________________________________________________________________________  Patient Active Problem List   Diagnosis Code    Colitis K52.9    HTN (hypertension) I10    Gout M10.9    Hyperglycemia R73.9    Transaminitis R74.0    Inguinal hernia K40.90    Hyponatremia E87.1    Hepatic steatosis K76.0    Diverticulitis K57.92         Assessment and Plan:  Acute colitis, most likely infectious. Continue with IV antibiotics and IV fluids and continue with clear liquids today, hoping to advance tomorrow. Will follow.        Signed By: Divine Winters MD     5/26/2018  3:18 PM

## 2018-05-27 LAB
ALBUMIN SERPL-MCNC: 2.8 G/DL (ref 3.5–5)
ALBUMIN/GLOB SERPL: 0.6 {RATIO} (ref 1.1–2.2)
ALP SERPL-CCNC: 62 U/L (ref 45–117)
ALT SERPL-CCNC: 39 U/L (ref 12–78)
ANION GAP SERPL CALC-SCNC: 11 MMOL/L (ref 5–15)
APPEARANCE FLD: ABNORMAL
AST SERPL-CCNC: 19 U/L (ref 15–37)
BASOPHILS # BLD: 0.1 K/UL (ref 0–0.1)
BASOPHILS NFR BLD: 0 % (ref 0–1)
BILIRUB DIRECT SERPL-MCNC: 0.5 MG/DL (ref 0–0.2)
BILIRUB SERPL-MCNC: 1.4 MG/DL (ref 0.2–1)
BODY FLD TYPE: NORMAL
BUN SERPL-MCNC: 6 MG/DL (ref 6–20)
BUN/CREAT SERPL: 7 (ref 12–20)
CALCIUM SERPL-MCNC: 8.2 MG/DL (ref 8.5–10.1)
CHLORIDE SERPL-SCNC: 97 MMOL/L (ref 97–108)
CO2 SERPL-SCNC: 24 MMOL/L (ref 21–32)
COLOR FLD: ABNORMAL
CREAT SERPL-MCNC: 0.92 MG/DL (ref 0.7–1.3)
CRYSTALS FLD MICRO: NORMAL
DIFFERENTIAL METHOD BLD: ABNORMAL
EOSINOPHIL # BLD: 0 K/UL (ref 0–0.4)
EOSINOPHIL NFR BLD: 0 % (ref 0–7)
ERYTHROCYTE [DISTWIDTH] IN BLOOD BY AUTOMATED COUNT: 11.9 % (ref 11.5–14.5)
GLOBULIN SER CALC-MCNC: 4.4 G/DL (ref 2–4)
GLUCOSE BLD STRIP.AUTO-MCNC: 156 MG/DL (ref 65–100)
GLUCOSE BLD STRIP.AUTO-MCNC: 166 MG/DL (ref 65–100)
GLUCOSE BLD STRIP.AUTO-MCNC: 182 MG/DL (ref 65–100)
GLUCOSE BLD STRIP.AUTO-MCNC: 186 MG/DL (ref 65–100)
GLUCOSE SERPL-MCNC: 155 MG/DL (ref 65–100)
HCT VFR BLD AUTO: 39.3 % (ref 36.6–50.3)
HGB BLD-MCNC: 13.7 G/DL (ref 12.1–17)
IMM GRANULOCYTES # BLD: 0.1 K/UL (ref 0–0.04)
IMM GRANULOCYTES NFR BLD AUTO: 1 % (ref 0–0.5)
LYMPHOCYTES # BLD: 1.8 K/UL (ref 0.8–3.5)
LYMPHOCYTES NFR BLD: 11 % (ref 12–49)
LYMPHOCYTES NFR SNV MANUAL: 1 % (ref 0–15)
MAGNESIUM SERPL-MCNC: 1.4 MG/DL (ref 1.6–2.4)
MCH RBC QN AUTO: 32.5 PG (ref 26–34)
MCHC RBC AUTO-ENTMCNC: 34.9 G/DL (ref 30–36.5)
MCV RBC AUTO: 93.1 FL (ref 80–99)
MONOCYTES # BLD: 1.5 K/UL (ref 0–1)
MONOCYTES NFR BLD: 9 % (ref 5–13)
MONOCYTES NFR SNV MANUAL: 10 % (ref 0–65)
NEUTROPHILS NFR SNV MANUAL: 89 % (ref 0–20)
NEUTS SEG # BLD: 13 K/UL (ref 1.8–8)
NEUTS SEG NFR BLD: 79 % (ref 32–75)
NRBC # BLD: 0 K/UL (ref 0–0.01)
NRBC BLD-RTO: 0 PER 100 WBC
NUC CELL # FLD: ABNORMAL /CU MM
PHOSPHATE SERPL-MCNC: 2.9 MG/DL (ref 2.6–4.7)
PLATELET # BLD AUTO: 221 K/UL (ref 150–400)
PMV BLD AUTO: 9 FL (ref 8.9–12.9)
POTASSIUM SERPL-SCNC: 3.6 MMOL/L (ref 3.5–5.1)
PROT SERPL-MCNC: 7.2 G/DL (ref 6.4–8.2)
RBC # BLD AUTO: 4.22 M/UL (ref 4.1–5.7)
RBC # FLD: >100 /CU MM
SERVICE CMNT-IMP: ABNORMAL
SODIUM SERPL-SCNC: 132 MMOL/L (ref 136–145)
SPECIMEN SOURCE FLD: ABNORMAL
WBC # BLD AUTO: 16.5 K/UL (ref 4.1–11.1)

## 2018-05-27 PROCEDURE — 74011250637 HC RX REV CODE- 250/637: Performed by: INTERNAL MEDICINE

## 2018-05-27 PROCEDURE — 36415 COLL VENOUS BLD VENIPUNCTURE: CPT | Performed by: INTERNAL MEDICINE

## 2018-05-27 PROCEDURE — 65270000029 HC RM PRIVATE

## 2018-05-27 PROCEDURE — 87075 CULTR BACTERIA EXCEPT BLOOD: CPT | Performed by: NURSE PRACTITIONER

## 2018-05-27 PROCEDURE — 80048 BASIC METABOLIC PNL TOTAL CA: CPT | Performed by: INTERNAL MEDICINE

## 2018-05-27 PROCEDURE — 74011250636 HC RX REV CODE- 250/636: Performed by: INTERNAL MEDICINE

## 2018-05-27 PROCEDURE — 89060 EXAM SYNOVIAL FLUID CRYSTALS: CPT | Performed by: NURSE PRACTITIONER

## 2018-05-27 PROCEDURE — 85025 COMPLETE CBC W/AUTO DIFF WBC: CPT | Performed by: INTERNAL MEDICINE

## 2018-05-27 PROCEDURE — 80076 HEPATIC FUNCTION PANEL: CPT | Performed by: INTERNAL MEDICINE

## 2018-05-27 PROCEDURE — 0S9D3ZX DRAINAGE OF LEFT KNEE JOINT, PERCUTANEOUS APPROACH, DIAGNOSTIC: ICD-10-PCS | Performed by: ORTHOPAEDIC SURGERY

## 2018-05-27 PROCEDURE — 82962 GLUCOSE BLOOD TEST: CPT

## 2018-05-27 PROCEDURE — 83735 ASSAY OF MAGNESIUM: CPT | Performed by: INTERNAL MEDICINE

## 2018-05-27 PROCEDURE — 89050 BODY FLUID CELL COUNT: CPT | Performed by: NURSE PRACTITIONER

## 2018-05-27 PROCEDURE — 74011000250 HC RX REV CODE- 250: Performed by: NURSE PRACTITIONER

## 2018-05-27 PROCEDURE — 87205 SMEAR GRAM STAIN: CPT | Performed by: NURSE PRACTITIONER

## 2018-05-27 PROCEDURE — 84100 ASSAY OF PHOSPHORUS: CPT | Performed by: INTERNAL MEDICINE

## 2018-05-27 PROCEDURE — 74011000250 HC RX REV CODE- 250: Performed by: INTERNAL MEDICINE

## 2018-05-27 RX ORDER — MAGNESIUM SULFATE HEPTAHYDRATE 40 MG/ML
2 INJECTION, SOLUTION INTRAVENOUS ONCE
Status: COMPLETED | OUTPATIENT
Start: 2018-05-27 | End: 2018-05-27

## 2018-05-27 RX ORDER — LIDOCAINE HYDROCHLORIDE 10 MG/ML
10 INJECTION, SOLUTION EPIDURAL; INFILTRATION; INTRACAUDAL; PERINEURAL ONCE
Status: COMPLETED | OUTPATIENT
Start: 2018-05-27 | End: 2018-05-27

## 2018-05-27 RX ORDER — HYDROMORPHONE HYDROCHLORIDE 2 MG/ML
1 INJECTION, SOLUTION INTRAMUSCULAR; INTRAVENOUS; SUBCUTANEOUS
Status: DISCONTINUED | OUTPATIENT
Start: 2018-05-27 | End: 2018-06-01 | Stop reason: HOSPADM

## 2018-05-27 RX ORDER — METRONIDAZOLE 500 MG/100ML
500 INJECTION, SOLUTION INTRAVENOUS EVERY 12 HOURS
Status: DISCONTINUED | OUTPATIENT
Start: 2018-05-27 | End: 2018-05-31

## 2018-05-27 RX ORDER — OXYCODONE AND ACETAMINOPHEN 5; 325 MG/1; MG/1
2 TABLET ORAL
Status: DISCONTINUED | OUTPATIENT
Start: 2018-05-27 | End: 2018-06-01 | Stop reason: HOSPADM

## 2018-05-27 RX ORDER — LEVOFLOXACIN 500 MG/1
500 TABLET, FILM COATED ORAL EVERY 24 HOURS
Status: DISCONTINUED | OUTPATIENT
Start: 2018-05-27 | End: 2018-05-27

## 2018-05-27 RX ORDER — METRONIDAZOLE 250 MG/1
500 TABLET ORAL EVERY 12 HOURS
Status: DISCONTINUED | OUTPATIENT
Start: 2018-05-27 | End: 2018-05-27

## 2018-05-27 RX ORDER — COLCHICINE 0.6 MG/1
0.6 CAPSULE ORAL DAILY
Status: DISCONTINUED | OUTPATIENT
Start: 2018-05-27 | End: 2018-06-01 | Stop reason: HOSPADM

## 2018-05-27 RX ORDER — LEVOFLOXACIN 5 MG/ML
500 INJECTION, SOLUTION INTRAVENOUS EVERY 24 HOURS
Status: DISCONTINUED | OUTPATIENT
Start: 2018-05-27 | End: 2018-05-31

## 2018-05-27 RX ADMIN — Medication 10 ML: at 05:12

## 2018-05-27 RX ADMIN — METRONIDAZOLE 500 MG: 500 INJECTION, SOLUTION INTRAVENOUS at 20:47

## 2018-05-27 RX ADMIN — OXYCODONE HYDROCHLORIDE AND ACETAMINOPHEN 2 TABLET: 5; 325 TABLET ORAL at 13:13

## 2018-05-27 RX ADMIN — METRONIDAZOLE 500 MG: 250 TABLET ORAL at 08:55

## 2018-05-27 RX ADMIN — Medication 10 ML: at 20:48

## 2018-05-27 RX ADMIN — PANTOPRAZOLE SODIUM 40 MG: 40 TABLET, DELAYED RELEASE ORAL at 08:54

## 2018-05-27 RX ADMIN — OXYCODONE HYDROCHLORIDE AND ACETAMINOPHEN 2 TABLET: 5; 325 TABLET ORAL at 19:33

## 2018-05-27 RX ADMIN — SODIUM CHLORIDE 125 ML/HR: 900 INJECTION, SOLUTION INTRAVENOUS at 06:44

## 2018-05-27 RX ADMIN — HEPARIN SODIUM 5000 UNITS: 5000 INJECTION, SOLUTION INTRAVENOUS; SUBCUTANEOUS at 05:11

## 2018-05-27 RX ADMIN — SODIUM CHLORIDE 125 ML/HR: 900 INJECTION, SOLUTION INTRAVENOUS at 16:11

## 2018-05-27 RX ADMIN — HEPARIN SODIUM 5000 UNITS: 5000 INJECTION, SOLUTION INTRAVENOUS; SUBCUTANEOUS at 13:13

## 2018-05-27 RX ADMIN — HYDROMORPHONE HYDROCHLORIDE 0.5 MG: 2 INJECTION INTRAMUSCULAR; INTRAVENOUS; SUBCUTANEOUS at 02:39

## 2018-05-27 RX ADMIN — HYDROMORPHONE HYDROCHLORIDE 1 MG: 2 INJECTION INTRAMUSCULAR; INTRAVENOUS; SUBCUTANEOUS at 16:04

## 2018-05-27 RX ADMIN — HYDROMORPHONE HYDROCHLORIDE 1 MG: 2 INJECTION INTRAMUSCULAR; INTRAVENOUS; SUBCUTANEOUS at 21:57

## 2018-05-27 RX ADMIN — MAGNESIUM SULFATE HEPTAHYDRATE 2 G: 40 INJECTION, SOLUTION INTRAVENOUS at 08:54

## 2018-05-27 RX ADMIN — HYDROMORPHONE HYDROCHLORIDE 0.5 MG: 2 INJECTION INTRAMUSCULAR; INTRAVENOUS; SUBCUTANEOUS at 09:07

## 2018-05-27 RX ADMIN — OXYCODONE HYDROCHLORIDE AND ACETAMINOPHEN 1 TABLET: 5; 325 TABLET ORAL at 05:11

## 2018-05-27 RX ADMIN — COLCHICINE 0.6 MG: 0.6 CAPSULE ORAL at 09:07

## 2018-05-27 RX ADMIN — HEPARIN SODIUM 5000 UNITS: 5000 INJECTION, SOLUTION INTRAVENOUS; SUBCUTANEOUS at 21:56

## 2018-05-27 RX ADMIN — LEVOFLOXACIN 500 MG: 5 INJECTION, SOLUTION INTRAVENOUS at 20:47

## 2018-05-27 RX ADMIN — LIDOCAINE HYDROCHLORIDE 10 ML: 10 INJECTION, SOLUTION EPIDURAL; INFILTRATION; INTRACAUDAL; PERINEURAL at 15:00

## 2018-05-27 RX ADMIN — Medication 10 ML: at 14:00

## 2018-05-27 NOTE — CONSULTS
Orthopedic History and Physical     Patient: Andrena Opitz MRN: [id number]  SSN: [id number]    YOB: 1975  Age: 38 y.o. Sex: male          Subjective:     Patient is a 38 y.o.  male who complains of left knee pain. Pt admitted with acute colitis being followed by Dr. Bjorn Gunn. Pt started on IV abx 3 days ago. Pt states that yesterday when he woke up his left knee was very painful, had lack of ROM and could barely touch the knee. Pt with hx of gout. Pt has seen [facility] in the past and has had his knee \"drained\". Pt feels this is worse that his gout in the past. Pt still running low grade temps, WBC continues to be elevated and increasing despite IV abx. Patient Active Problem List    Diagnosis Date Noted    Colitis 05/24/2018    HTN (hypertension) 05/24/2018    Gout 05/24/2018    Hyperglycemia 05/24/2018    Transaminitis 05/24/2018    Inguinal hernia 05/24/2018    Hyponatremia 05/24/2018    Hepatic steatosis 05/24/2018    Diverticulitis 05/24/2018     Past Medical History:   Diagnosis Date    Gout     Hypertension       Past Surgical History:   Procedure Laterality Date    HX WISDOM TEETH EXTRACTION        Prior to Admission medications    Medication Sig Start Date End Date Taking? Authorizing Provider   colchicine 0.6 mg tablet Take 0.6 mg by mouth daily. Yes Historical Provider   omeprazole (PRILOSEC) 20 mg capsule Take 20 mg by mouth daily.    Yes Phys Other, MD     Current Facility-Administered Medications   Medication Dose Route Frequency    colchicine (MITIGARE) capsule 0.6 mg  0.6 mg Oral DAILY    metroNIDAZOLE (FLAGYL) IVPB premix 500 mg  500 mg IntraVENous Q12H    levoFLOXacin (LEVAQUIN) 500 mg in D5W IVPB  500 mg IntraVENous Q24H    HYDROmorphone (DILAUDID) injection 1 mg  1 mg IntraVENous Q3H PRN    oxyCODONE-acetaminophen (PERCOCET) 5-325 mg per tablet 2 Tab  2 Tab Oral Q6H PRN    pantoprazole (PROTONIX) tablet 40 mg  40 mg Oral DAILY    sodium chloride (NS) flush 5-10 mL  5-10 mL IntraVENous Q8H    sodium chloride (NS) flush 5-10 mL  5-10 mL IntraVENous PRN    0.9% sodium chloride infusion  125 mL/hr IntraVENous CONTINUOUS    heparin (porcine) injection 5,000 Units  5,000 Units SubCUTAneous Q8H    glucose chewable tablet 16 g  4 Tab Oral PRN    dextrose (D50W) injection syrg 12.5-25 g  12.5-25 g IntraVENous PRN    glucagon (GLUCAGEN) injection 1 mg  1 mg IntraMUSCular PRN    insulin lispro (HUMALOG) injection   SubCUTAneous AC&HS    hydrALAZINE (APRESOLINE) 20 mg/mL injection 10 mg  10 mg IntraVENous Q6H PRN      No Known Allergies   Social History   Substance Use Topics    Smoking status: Never Smoker    Smokeless tobacco: Never Used    Alcohol use 1.2 oz/week     2 Glasses of wine per week      Family History   Problem Relation Age of Onset    Hypertension Mother     Hypertension Father         Review of Systems  A comprehensive review of systems was negative except for that written in the HPI. Objective:     No data found. Temp (24hrs), Av.9 °F (37.2 °C), Min:98.3 °F (36.8 °C), Max:99.6 °F (37.6 °C)      EXAM:   Physical Exam:   Patient appears generally well, mood and affect are appropriate and pleasant. Musc: left knee with large effusion. Very tender to palpation. Pt unable to range knee at all due to pain. Calves soft/ nontender. PP +2  Vascular: 2+ dorsalis pedis pulses bilaterally.     Imaging Review    Imaging Review: no xrays    Labs:   Recent Results (from the past 12 hour(s))   METABOLIC PANEL, BASIC    Collection Time: 18  5:18 AM   Result Value Ref Range    Sodium 132 (L) 136 - 145 mmol/L    Potassium 3.6 3.5 - 5.1 mmol/L    Chloride 97 97 - 108 mmol/L    CO2 24 21 - 32 mmol/L    Anion gap 11 5 - 15 mmol/L    Glucose 155 (H) 65 - 100 mg/dL    BUN 6 6 - 20 MG/DL    Creatinine 0.92 0.70 - 1.30 MG/DL    BUN/Creatinine ratio 7 (L) 12 - 20      GFR est AA >60 >60 ml/min/1.73m2    GFR est non-AA >60 >60 ml/min/1.73m2    Calcium 8.2 (L) 8.5 - 10.1 MG/DL   CBC WITH AUTOMATED DIFF    Collection Time: 05/27/18  5:18 AM   Result Value Ref Range    WBC 16.5 (H) 4.1 - 11.1 K/uL    RBC 4.22 4.10 - 5.70 M/uL    HGB 13.7 12.1 - 17.0 g/dL    HCT 39.3 36.6 - 50.3 %    MCV 93.1 80.0 - 99.0 FL    MCH 32.5 26.0 - 34.0 PG    MCHC 34.9 30.0 - 36.5 g/dL    RDW 11.9 11.5 - 14.5 %    PLATELET 915 448 - 717 K/uL    MPV 9.0 8.9 - 12.9 FL    NRBC 0.0 0  WBC    ABSOLUTE NRBC 0.00 0.00 - 0.01 K/uL    NEUTROPHILS 79 (H) 32 - 75 %    LYMPHOCYTES 11 (L) 12 - 49 %    MONOCYTES 9 5 - 13 %    EOSINOPHILS 0 0 - 7 %    BASOPHILS 0 0 - 1 %    IMMATURE GRANULOCYTES 1 (H) 0.0 - 0.5 %    ABS. NEUTROPHILS 13.0 (H) 1.8 - 8.0 K/UL    ABS. LYMPHOCYTES 1.8 0.8 - 3.5 K/UL    ABS. MONOCYTES 1.5 (H) 0.0 - 1.0 K/UL    ABS. EOSINOPHILS 0.0 0.0 - 0.4 K/UL    ABS. BASOPHILS 0.1 0.0 - 0.1 K/UL    ABS. IMM. GRANS. 0.1 (H) 0.00 - 0.04 K/UL    DF AUTOMATED     MAGNESIUM    Collection Time: 05/27/18  5:18 AM   Result Value Ref Range    Magnesium 1.4 (L) 1.6 - 2.4 mg/dL   PHOSPHORUS    Collection Time: 05/27/18  5:18 AM   Result Value Ref Range    Phosphorus 2.9 2.6 - 4.7 MG/DL   HEPATIC FUNCTION PANEL    Collection Time: 05/27/18  5:18 AM   Result Value Ref Range    Protein, total 7.2 6.4 - 8.2 g/dL    Albumin 2.8 (L) 3.5 - 5.0 g/dL    Globulin 4.4 (H) 2.0 - 4.0 g/dL    A-G Ratio 0.6 (L) 1.1 - 2.2      Bilirubin, total 1.4 (H) 0.2 - 1.0 MG/DL    Bilirubin, direct 0.5 (H) 0.0 - 0.2 MG/DL    Alk.  phosphatase 62 45 - 117 U/L    AST (SGOT) 19 15 - 37 U/L    ALT (SGPT) 39 12 - 78 U/L   GLUCOSE, POC    Collection Time: 05/27/18  7:09 AM   Result Value Ref Range    Glucose (POC) 166 (H) 65 - 100 mg/dL    Performed by Clifm Prader (PCT)    GLUCOSE, POC    Collection Time: 05/27/18 12:02 PM   Result Value Ref Range    Glucose (POC) 186 (H) 65 - 100 mg/dL    Performed by Shara Rehman    CELL COUNT, BODY FLUID    Collection Time: 05/27/18  3:00 PM   Result Value Ref Range BODY FLUID TYPE LEFT KNEE      FLUID COLOR PINK      FLUID APPEARANCE TURBID      FLUID RBC CT. >100 (H) 0 /cu mm    FLUID NUCLEATED CELLS 33364 /cu mm    SYNOVIAL FLD SEGS 89 (H) 0 - 20 %    SYNOVIAL FLD LYMPHS 1 0 - 15 %    SYNOVIAL FLD MONOS 10 0 - 65 %   CRYSTALS, SYNOVIAL FLUID    Collection Time: 05/27/18  3:00 PM   Result Value Ref Range    FLUID TYPE(7) KNEE FLUID      Crystals, body fluid FEW INTRACELLULAR    GLUCOSE, POC    Collection Time: 05/27/18  4:05 PM   Result Value Ref Range    Glucose (POC) 182 (H) 65 - 100 mg/dL    Performed by Maty John        Assessment:     Patient Active Problem List    Diagnosis Date Noted    Colitis 05/24/2018    HTN (hypertension) 05/24/2018    Gout 05/24/2018    Hyperglycemia 05/24/2018    Transaminitis 05/24/2018    Inguinal hernia 05/24/2018    Hyponatremia 05/24/2018    Hepatic steatosis 05/24/2018    Diverticulitis 05/24/2018         Plan:   Left knee effusion and pain, leukocytosis, febrile with hx of gout  Discussed with patient about nature of condition and treatment options. Procedure: After verbal consent for aspiration and under sterile conditions, after thoroughly cleansing skin with betadine and alcohol, I injected 10 ml of Lidocaine 1% for local anesthetic. I proceeded with aspiration from the superior lateral approach of the left knee, aspirated 45 ml of nickolas fluid from the left knee . Fluid was sent to lab for Gram stain, Culture and cell count, cyrstals. Dressing applied. Patient tolerated procedure well. Will follow up after labs resulted and notify atiya Monahan      Discussed case with Dr. Sandeep Oro. Osmar Dobbs, EVELINE  Orthopaedic Surgery Nurse Practitioner     Addendum: WBC in tap 20K, + crystals. Discussed with Dr. Rosalba Simental. Will await for culture results. We will hold off on any injection due to other acute issues ongoing at this time.      AW

## 2018-05-27 NOTE — PROGRESS NOTES
Carlos Beach M.D.  [phone]           GI PROGRESS NOTE        NAME: Belle Salter III   :  1975   MRN:  [id number]       Subjective:   Reports less abdominal pain, continues to have pain from left knee, reports nausea, no vomiting, however barely tolerating clears. No bowel movements today      Objective: In pain      VITALS:   Last 24hrs VS reviewed since prior progress note. Most recent are:  Visit Vitals    /79 (BP 1 Location: Right arm)    Pulse (!) 111    Temp 99.6 °F (37.6 °C)    Resp 18    Ht 6' (1.829 m)    Wt 98.9 kg (218 lb)    SpO2 95%    BMI 29.57 kg/m2       Intake/Output Summary (Last 24 hours) at 18 1334  Last data filed at 18 0953   Gross per 24 hour   Intake              480 ml   Output              525 ml   Net              -45 ml       PHYSICAL EXAM:  General: Alert, in no acute distress    HEENT: Anicteric sclerae. Lungs:            CTA Bilaterally. Heart:  Regular  rhythm,    Abdomen: Soft, less distended, LLQ tenderness, less than yesterday.  (+)Bowel sounds  Extremities: Tender and swollen left knee      Lab Data Reviewed:   Recent Labs      18   0518  18   0448   WBC  16.5*  14.0*   HGB  13.7  14.1   HCT  39.3  41.2   PLT  221  205     Recent Labs      18   0518  18   0448   NA  132*  134*   K  3.6  3.8   CL  97  99   CO2  24  25   BUN  6  6   CREA  0.92  0.85   GLU  155*  157*   PHOS  2.9  3.5   CA  8.2*  8.3*     Recent Labs      18   0518  18   0448   18   1847   SGOT  19  41*   < >  89*   AP  62  56   < >  73   TP  7.2  6.6   < >  8.3*   ALB  2.8*  3.0*   < >  3.8   GLOB  4.4*  3.6   < >  4.5*   LPSE   --    --    --   194    < > = values in this interval not displayed.        ________________________________________________________________________  Patient Active Problem List   Diagnosis Code    Colitis K52.9    HTN (hypertension) I10    Gout M10.9    Hyperglycemia R73.9    Transaminitis R74.0    Inguinal hernia K40.90    Hyponatremia E87.1    Hepatic steatosis K76.0    Diverticulitis K57.92         Assessment and Plan:  Acute colitis, likely infectious, clinically abdominal pain is better, wonder if leukocytosis is related to his knee rather than abdomen. Would continue same management for now, as he is awaiting knee drainage. Repeating labs in am, will see in am and determine if colonoscopy is warranted early next week.        Signed By: Efren Wesley MD     5/27/2018  1:34 PM

## 2018-05-27 NOTE — PROGRESS NOTES
Bedside and Verbal shift change report given to Dirk Helm RN (oncoming nurse) by Jolene Vargas RN (offgoing nurse). Report included the following information SBAR, MAR, Accordion and Recent Results.

## 2018-05-27 NOTE — PROGRESS NOTES
Moreno Hand [facility]  [address] [facility], [address] [place] [address]  [phone]      Medical Progress Note      NAME: Taylor Higgins III   :  1975  MRM:  [id number]    Date/Time: 2018  3:47 PM       Assessment and Plan:     1. Colitis (2018). Pain still significant and WBC further increased. Continue IV Levaquin and flagyl. . Continue clears for now and possibly transition to full liquid this afternoon if pain decreases. Continue IVF, pain management. Appreciated GI and gen surg input.     2. LT side Inguinal hernia / Meckle's Diverticulum. General Surgery consult appreciated. Will follow-up outpatient for elective surgery.     3. HTN (hypertension) (2018). Pt is not on BP meds. Cont hydralazine PRN. Needs PO meds on discharge      4. Hyperglycemia (2018) c/w new onset diabetes mellitus type 2. Pt denies Hx of DM but A1c is 8.3. Will start SSI and FSBG. Will need Rx for meter, strips, lancets, and metformin on discharge. Addressed importance of PCP establishment with patient, has list at bedside.     5. Transaminitis / Hepatic steatosis (2018). Continue to monitor LFTs.     6. Hyponatremia (2018), mild. Likely due to volume depletion. Continue IVF      7.  Gout (2018). With acute flare of left knee. On colchicine but patient states \"once it gets to this point, colchicine doesn't help and it needs to be drained. \" Has arthrocentesis at least annual per patient. Will ask ortho to assist with arthrocentesis and evaluation of knee effusion. Subjective:     Chief Complaint:  F/u abd pain, colitis. Patient seen and examined. Chart reviewed. Patient continues abd pain that is slightly improved. Had loose BM last PM. Having severe left knee pain    ROS:  (bold if positive, if negative)    Abd PainNausea          Objective:     Last 24hrs VS reviewed since prior progress note.  Most recent are:    Visit Vitals    /81 (BP 1 Location: Right arm, BP Patient Position: At rest)    Pulse (!) 105    Temp 98.3 °F (36.8 °C)    Resp 18    Ht 6' (1.829 m)    Wt 98.9 kg (218 lb)    SpO2 96%    BMI 29.57 kg/m2     SpO2 Readings from Last 6 Encounters:   05/27/18 96%   12/14/16 94%            Intake/Output Summary (Last 24 hours) at 05/27/18 1218  Last data filed at 05/27/18 0953   Gross per 24 hour   Intake              480 ml   Output              525 ml   Net              -45 ml        Physical Exam:    Gen:  Well-developed, well-nourished, in no acute distress  HEENT:  Pink conjunctivae, PERRL, hearing intact to voice, moist mucous membranes  Neck:  Supple, without masses, thyroid non-tender  Resp:  No accessory muscle use, clear breath sounds without wheezes rales or rhonchi  Card:  No murmurs, normal S1, S2 without thrills, bruits or peripheral edema  Abd:  Soft, mildly ttp in all 4 quads, non-distended, normoactive bowel sounds are present, no palpable organomegaly and no detectable hernias  Lymph:  No cervical or inguinal adenopathy  Musc:  Left knee with significant pain and joint effusion  Skin:  No rashes or ulcers, skin turgor is good  Neuro:  Cranial nerves are grossly intact, no focal motor weakness, follows commands appropriately  Psych:  Good insight, oriented to person, place and time, alert    Telemetry reviewed:   normal sinus rhythm  __________________________________________________________________  Medications Reviewed: (see below)  Medications:     Current Facility-Administered Medications   Medication Dose Route Frequency    colchicine (MITIGARE) capsule 0.6 mg  0.6 mg Oral DAILY    metroNIDAZOLE (FLAGYL) IVPB premix 500 mg  500 mg IntraVENous Q12H    levoFLOXacin (LEVAQUIN) 500 mg in D5W IVPB  500 mg IntraVENous Q24H    HYDROmorphone (DILAUDID) injection 1 mg  1 mg IntraVENous Q3H PRN    oxyCODONE-acetaminophen (PERCOCET) 5-325 mg per tablet 2 Tab  2 Tab Oral Q6H PRN    pantoprazole (PROTONIX) tablet 40 mg  40 mg Oral DAILY    sodium chloride (NS) flush 5-10 mL  5-10 mL IntraVENous Q8H    sodium chloride (NS) flush 5-10 mL  5-10 mL IntraVENous PRN    0.9% sodium chloride infusion  125 mL/hr IntraVENous CONTINUOUS    heparin (porcine) injection 5,000 Units  5,000 Units SubCUTAneous Q8H    glucose chewable tablet 16 g  4 Tab Oral PRN    dextrose (D50W) injection syrg 12.5-25 g  12.5-25 g IntraVENous PRN    glucagon (GLUCAGEN) injection 1 mg  1 mg IntraMUSCular PRN    insulin lispro (HUMALOG) injection   SubCUTAneous AC&HS    hydrALAZINE (APRESOLINE) 20 mg/mL injection 10 mg  10 mg IntraVENous Q6H PRN        Lab Data Reviewed: (see below)  Lab Review:     Recent Labs      05/27/18 0518 05/26/18 0448 05/25/18   0306   WBC  16.5*  14.0*  10.0   HGB  13.7  14.1  14.3   HCT  39.3  41.2  40.7   PLT  221  205  210     Recent Labs      05/27/18 0518 05/26/18 0448 05/25/18   0306   NA  132*  134*  135*   K  3.6  3.8  3.9   CL  97  99  100   CO2  24  25  24   GLU  155*  157*  165*   BUN  6  6  9   CREA  0.92  0.85  0.82   CA  8.2*  8.3*  8.4*   MG  1.4*  1.5*   --    PHOS  2.9  3.5   --    ALB  2.8*  3.0*  3.1*   TBILI  1.4*  1.4*  0.7   SGOT  19  41*  67*   ALT  39  55  65     Lab Results   Component Value Date/Time    Glucose (POC) 186 (H) 05/27/2018 12:02 PM    Glucose (POC) 166 (H) 05/27/2018 07:09 AM    Glucose (POC) 170 (H) 05/26/2018 09:30 PM    Glucose (POC) 215 (H) 05/26/2018 04:00 PM    Glucose (POC) 211 (H) 05/26/2018 11:21 AM     No results for input(s): PH, PCO2, PO2, HCO3, FIO2 in the last 72 hours. No results for input(s): INR in the last 72 hours.     No lab exists for component: Jennett Valdez  All Micro Results     Procedure Component Value Units Date/Time    CULTURE, STOOL [[id number]]     Order Status:  Sent Specimen:  Stool     OVA & PARASITES, STOOL [[id number]]     Order Status:  Sent Specimen:  Stool from Stool     C. DIFFICILE (DNA) [[id number]]     Order Status:  Sent     URINE CULTURE HOLD SAMPLE [[id number]] Collected:  05/24/18 2149    Order Status:  Completed Specimen:  Urine from Serum Updated:  05/24/18 2157     Urine culture hold         URINE ON HOLD IN MICROBIOLOGY DEPT FOR 3 DAYS. IF UNPRESERVED URINE IS SUBMITTED, IT CANNOT BE USED FOR ADDITIONAL TESTING AFTER 24 HRS, RECOLLECTION WILL BE REQUIRED. I have reviewed notes of prior 24hr.     Other pertinent lab: None    Total time spent with patient: 28 [address] discussed with: Patient and Nursing Staff    Discussed:  Care Plan and D/C Planning    Prophylaxis:  Lovenox    Disposition:  Home w/Family           ___________________________________________________    Attending Physician: Mandeep Villela DO

## 2018-05-28 LAB
ANION GAP SERPL CALC-SCNC: 13 MMOL/L (ref 5–15)
BASOPHILS # BLD: 0.1 K/UL (ref 0–0.1)
BASOPHILS NFR BLD: 0 % (ref 0–1)
BUN SERPL-MCNC: 7 MG/DL (ref 6–20)
BUN/CREAT SERPL: 8 (ref 12–20)
CALCIUM SERPL-MCNC: 8.2 MG/DL (ref 8.5–10.1)
CHLORIDE SERPL-SCNC: 97 MMOL/L (ref 97–108)
CO2 SERPL-SCNC: 23 MMOL/L (ref 21–32)
CREAT SERPL-MCNC: 0.84 MG/DL (ref 0.7–1.3)
DIFFERENTIAL METHOD BLD: ABNORMAL
EOSINOPHIL # BLD: 0 K/UL (ref 0–0.4)
EOSINOPHIL NFR BLD: 0 % (ref 0–7)
ERYTHROCYTE [DISTWIDTH] IN BLOOD BY AUTOMATED COUNT: 11.9 % (ref 11.5–14.5)
GLUCOSE BLD STRIP.AUTO-MCNC: 133 MG/DL (ref 65–100)
GLUCOSE BLD STRIP.AUTO-MCNC: 156 MG/DL (ref 65–100)
GLUCOSE BLD STRIP.AUTO-MCNC: 172 MG/DL (ref 65–100)
GLUCOSE BLD STRIP.AUTO-MCNC: 242 MG/DL (ref 65–100)
GLUCOSE SERPL-MCNC: 147 MG/DL (ref 65–100)
HCT VFR BLD AUTO: 35.8 % (ref 36.6–50.3)
HGB BLD-MCNC: 12.3 G/DL (ref 12.1–17)
IMM GRANULOCYTES # BLD: 0.1 K/UL (ref 0–0.04)
IMM GRANULOCYTES NFR BLD AUTO: 1 % (ref 0–0.5)
LYMPHOCYTES # BLD: 1.3 K/UL (ref 0.8–3.5)
LYMPHOCYTES NFR BLD: 9 % (ref 12–49)
MAGNESIUM SERPL-MCNC: 1.8 MG/DL (ref 1.6–2.4)
MCH RBC QN AUTO: 31.7 PG (ref 26–34)
MCHC RBC AUTO-ENTMCNC: 34.4 G/DL (ref 30–36.5)
MCV RBC AUTO: 92.3 FL (ref 80–99)
MONOCYTES # BLD: 1.3 K/UL (ref 0–1)
MONOCYTES NFR BLD: 9 % (ref 5–13)
NEUTS SEG # BLD: 11.7 K/UL (ref 1.8–8)
NEUTS SEG NFR BLD: 81 % (ref 32–75)
NRBC # BLD: 0 K/UL (ref 0–0.01)
NRBC BLD-RTO: 0 PER 100 WBC
PHOSPHATE SERPL-MCNC: 2.8 MG/DL (ref 2.6–4.7)
PLATELET # BLD AUTO: 251 K/UL (ref 150–400)
PMV BLD AUTO: 9.3 FL (ref 8.9–12.9)
POTASSIUM SERPL-SCNC: 3.4 MMOL/L (ref 3.5–5.1)
RBC # BLD AUTO: 3.88 M/UL (ref 4.1–5.7)
SERVICE CMNT-IMP: ABNORMAL
SODIUM SERPL-SCNC: 133 MMOL/L (ref 136–145)
WBC # BLD AUTO: 14.5 K/UL (ref 4.1–11.1)

## 2018-05-28 PROCEDURE — 82962 GLUCOSE BLOOD TEST: CPT

## 2018-05-28 PROCEDURE — 74011250637 HC RX REV CODE- 250/637: Performed by: INTERNAL MEDICINE

## 2018-05-28 PROCEDURE — 85025 COMPLETE CBC W/AUTO DIFF WBC: CPT | Performed by: INTERNAL MEDICINE

## 2018-05-28 PROCEDURE — 84100 ASSAY OF PHOSPHORUS: CPT | Performed by: INTERNAL MEDICINE

## 2018-05-28 PROCEDURE — 83735 ASSAY OF MAGNESIUM: CPT | Performed by: INTERNAL MEDICINE

## 2018-05-28 PROCEDURE — 36415 COLL VENOUS BLD VENIPUNCTURE: CPT | Performed by: INTERNAL MEDICINE

## 2018-05-28 PROCEDURE — 74011250637 HC RX REV CODE- 250/637: Performed by: PHYSICIAN ASSISTANT

## 2018-05-28 PROCEDURE — 20610 DRAIN/INJ JOINT/BURSA W/O US: CPT

## 2018-05-28 PROCEDURE — 74011636637 HC RX REV CODE- 636/637: Performed by: INTERNAL MEDICINE

## 2018-05-28 PROCEDURE — 80048 BASIC METABOLIC PNL TOTAL CA: CPT | Performed by: INTERNAL MEDICINE

## 2018-05-28 PROCEDURE — 65270000029 HC RM PRIVATE

## 2018-05-28 PROCEDURE — 74011250636 HC RX REV CODE- 250/636: Performed by: INTERNAL MEDICINE

## 2018-05-28 PROCEDURE — 74011000250 HC RX REV CODE- 250: Performed by: INTERNAL MEDICINE

## 2018-05-28 RX ORDER — POTASSIUM CHLORIDE 750 MG/1
40 TABLET, FILM COATED, EXTENDED RELEASE ORAL
Status: COMPLETED | OUTPATIENT
Start: 2018-05-28 | End: 2018-05-28

## 2018-05-28 RX ORDER — METFORMIN HYDROCHLORIDE 500 MG/1
500 TABLET ORAL 2 TIMES DAILY WITH MEALS
Status: DISCONTINUED | OUTPATIENT
Start: 2018-05-28 | End: 2018-05-28

## 2018-05-28 RX ORDER — INDOMETHACIN 25 MG/1
25 CAPSULE ORAL
Status: COMPLETED | OUTPATIENT
Start: 2018-05-28 | End: 2018-05-31

## 2018-05-28 RX ADMIN — PANTOPRAZOLE SODIUM 40 MG: 40 TABLET, DELAYED RELEASE ORAL at 09:27

## 2018-05-28 RX ADMIN — METRONIDAZOLE 500 MG: 500 INJECTION, SOLUTION INTRAVENOUS at 09:27

## 2018-05-28 RX ADMIN — COLCHICINE 0.6 MG: 0.6 CAPSULE ORAL at 12:04

## 2018-05-28 RX ADMIN — HEPARIN SODIUM 5000 UNITS: 5000 INJECTION, SOLUTION INTRAVENOUS; SUBCUTANEOUS at 06:21

## 2018-05-28 RX ADMIN — OXYCODONE HYDROCHLORIDE AND ACETAMINOPHEN 2 TABLET: 5; 325 TABLET ORAL at 22:05

## 2018-05-28 RX ADMIN — HEPARIN SODIUM 5000 UNITS: 5000 INJECTION, SOLUTION INTRAVENOUS; SUBCUTANEOUS at 15:01

## 2018-05-28 RX ADMIN — SODIUM CHLORIDE 125 ML/HR: 900 INJECTION, SOLUTION INTRAVENOUS at 22:06

## 2018-05-28 RX ADMIN — Medication 10 ML: at 04:43

## 2018-05-28 RX ADMIN — HYDROMORPHONE HYDROCHLORIDE 1 MG: 2 INJECTION INTRAMUSCULAR; INTRAVENOUS; SUBCUTANEOUS at 09:28

## 2018-05-28 RX ADMIN — Medication 10 ML: at 06:22

## 2018-05-28 RX ADMIN — LEVOFLOXACIN 500 MG: 5 INJECTION, SOLUTION INTRAVENOUS at 23:25

## 2018-05-28 RX ADMIN — INSULIN LISPRO 2 UNITS: 100 INJECTION, SOLUTION INTRAVENOUS; SUBCUTANEOUS at 16:51

## 2018-05-28 RX ADMIN — METRONIDAZOLE 500 MG: 500 INJECTION, SOLUTION INTRAVENOUS at 22:06

## 2018-05-28 RX ADMIN — Medication 10 ML: at 15:02

## 2018-05-28 RX ADMIN — HYDROMORPHONE HYDROCHLORIDE 1 MG: 2 INJECTION INTRAMUSCULAR; INTRAVENOUS; SUBCUTANEOUS at 04:42

## 2018-05-28 RX ADMIN — SODIUM CHLORIDE 125 ML/HR: 900 INJECTION, SOLUTION INTRAVENOUS at 12:20

## 2018-05-28 RX ADMIN — OXYCODONE HYDROCHLORIDE AND ACETAMINOPHEN 2 TABLET: 5; 325 TABLET ORAL at 13:36

## 2018-05-28 RX ADMIN — OXYCODONE HYDROCHLORIDE AND ACETAMINOPHEN 2 TABLET: 5; 325 TABLET ORAL at 02:46

## 2018-05-28 RX ADMIN — POTASSIUM CHLORIDE 40 MEQ: 750 TABLET, EXTENDED RELEASE ORAL at 13:35

## 2018-05-28 RX ADMIN — INDOMETHACIN 25 MG: 25 CAPSULE ORAL at 12:58

## 2018-05-28 RX ADMIN — Medication 10 ML: at 22:09

## 2018-05-28 RX ADMIN — HEPARIN SODIUM 5000 UNITS: 5000 INJECTION, SOLUTION INTRAVENOUS; SUBCUTANEOUS at 22:05

## 2018-05-28 RX ADMIN — INDOMETHACIN 25 MG: 25 CAPSULE ORAL at 16:51

## 2018-05-28 RX ADMIN — SODIUM CHLORIDE 125 ML/HR: 900 INJECTION, SOLUTION INTRAVENOUS at 03:12

## 2018-05-28 NOTE — PROGRESS NOTES
Bedside and Verbal shift change report given to Itz Ramirez (oncoming nurse) by Iris Michelle (offgoing nurse). Report included the following information SBAR, Kardex, Intake/Output, MAR, Accordion and Recent Results.

## 2018-05-28 NOTE — PROGRESS NOTES
Moreno Hand [facility] 79  [address] [facility], [place], [address] [place] [address]  [phone]      Medical Progress Note      NAME: Marielos Moore III   :  1975  MRM:  [id number]    Date/Time: 2018  11:45 AM       Assessment and Plan:   1.  Colitis (2018). Continue IV Levaquin and flagyl. On clears. Continue IVF, pain management. Appreciated GI and gen surg input.      2.  LT side Inguinal hernia / Meckle's Diverticulum. General Surgery consult appreciated. Will follow-up outpatient for elective surgery.      3.  HTN (hypertension) (2018). Pt is not on BP meds. Cont hydralazine PRN.     4.  Hyperglycemia (2018) c/w new onset diabetes mellitus type 2. Pt denies Hx of DM but A1c is 8.3. Will start SSI and FSBG. Will need Rx for meter, strips, lancets, and metformin on discharge. Addressed importance of PCP establishment with patient, has list at bedside.      5.  Transaminitis / Hepatic steatosis (2018). Continue to monitor LFTs.      6.  Hyponatremia (2018), mild. Likely due to volume depletion. Continue IVF       7.  Gout (2018). With acute flare of left knee. Started on indocin. S/o arthrocentesis and positive for crystals.              Subjective:     Chief Complaint:  Follow up of pt who was admitted with colitis. Still c/o abdominal pain and LT knee pain     ROS:  (bold if positive, if negative)     Abd Pain  Tolerating PT  Tolerating Diet        Objective:     Last 24hrs VS reviewed since prior progress note.  Most recent are:    Visit Vitals    /72 (BP 1 Location: Left arm, BP Patient Position: At rest)    Pulse (!) 102    Temp 99 °F (37.2 °C)    Resp 18    Ht 6' (1.829 m)    Wt 98.9 kg (218 lb)    SpO2 94%    BMI 29.57 kg/m2     SpO2 Readings from Last 6 Encounters:   18 94%   16 94%          Intake/Output Summary (Last 24 hours) at 18 1145  Last data filed at 18 0344   Gross per 24 hour   Intake              340 ml Output             2550 ml   Net            -2210 ml        Physical Exam:    Gen:  Well-developed, well-nourished, in no acute distress  HEENT:  Pink conjunctivae, PERRL, hearing intact to voice, moist mucous membranes  Neck:  Supple, without masses, thyroid non-tender  Resp:  No accessory muscle use, clear breath sounds without wheezes rales or rhonchi  Card:  No murmurs, normal S1, S2 without thrills, bruits or peripheral edema  Abd:  Soft, non-tender, non-distended, normoactive bowel sounds are present, no palpable organomegaly and no detectable hernias  Lymph:  No cervical or inguinal adenopathy  Musc:  No cyanosis or clubbing  Skin:  No rashes or ulcers, skin turgor is good  Neuro:  Cranial nerves are grossly intact, no focal motor weakness, follows commands appropriately  Psych:  Good insight, oriented to person, place and time, alert  __________________________________________________________________  Medications Reviewed: (see below)  Medications:     Current Facility-Administered Medications   Medication Dose Route Frequency    potassium chloride SR (KLOR-CON 10) tablet 40 mEq  40 mEq Oral NOW    colchicine (MITIGARE) capsule 0.6 mg  0.6 mg Oral DAILY    metroNIDAZOLE (FLAGYL) IVPB premix 500 mg  500 mg IntraVENous Q12H    levoFLOXacin (LEVAQUIN) 500 mg in D5W IVPB  500 mg IntraVENous Q24H    HYDROmorphone (DILAUDID) injection 1 mg  1 mg IntraVENous Q3H PRN    oxyCODONE-acetaminophen (PERCOCET) 5-325 mg per tablet 2 Tab  2 Tab Oral Q6H PRN    pantoprazole (PROTONIX) tablet 40 mg  40 mg Oral DAILY    sodium chloride (NS) flush 5-10 mL  5-10 mL IntraVENous Q8H    sodium chloride (NS) flush 5-10 mL  5-10 mL IntraVENous PRN    0.9% sodium chloride infusion  125 mL/hr IntraVENous CONTINUOUS    heparin (porcine) injection 5,000 Units  5,000 Units SubCUTAneous Q8H    glucose chewable tablet 16 g  4 Tab Oral PRN    dextrose (D50W) injection syrg 12.5-25 g  12.5-25 g IntraVENous PRN    glucagon (GLUCAGEN) injection 1 mg  1 mg IntraMUSCular PRN    insulin lispro (HUMALOG) injection   SubCUTAneous AC&HS    hydrALAZINE (APRESOLINE) 20 mg/mL injection 10 mg  10 mg IntraVENous Q6H PRN        Lab Data Reviewed: (see below)  Lab Review:     Recent Labs      05/28/18 0459 05/27/18 0518 05/26/18 0448   WBC  14.5*  16.5*  14.0*   HGB  12.3  13.7  14.1   HCT  35.8*  39.3  41.2   PLT  251  221  205     Recent Labs      05/28/18 0459 05/27/18 0518 05/26/18 0448   NA  133*  132*  134*   K  3.4*  3.6  3.8   CL  97  97  99   CO2  23  24  25   GLU  147*  155*  157*   BUN  7  6  6   CREA  0.84  0.92  0.85   CA  8.2*  8.2*  8.3*   MG  1.8  1.4*  1.5*   PHOS  2.8  2.9  3.5   ALB   --   2.8*  3.0*   TBILI   --   1.4*  1.4*   SGOT   --   19  41*   ALT   --   39  55     Lab Results   Component Value Date/Time    Glucose (POC) 133 (H) 05/28/2018 11:42 AM    Glucose (POC) 156 (H) 05/28/2018 06:59 AM    Glucose (POC) 156 (H) 05/27/2018 09:38 PM    Glucose (POC) 182 (H) 05/27/2018 04:05 PM    Glucose (POC) 186 (H) 05/27/2018 12:02 PM     No results for input(s): PH, PCO2, PO2, HCO3, FIO2 in the last 72 hours. No results for input(s): INR in the last 72 hours.     No lab exists for component: INREXT  All Micro Results     Procedure Component Value Units Date/Time    CULTURE, BODY FLUID Ranulfo Velez STAIN [[id number]] Collected:  05/27/18 1500    Order Status:  Completed Specimen:  Knee Fluid Updated:  05/28/18 1114     Special Requests: NO SPECIAL REQUESTS        GRAM STAIN 1+ WBCS SEEN         NO ORGANISMS SEEN        Culture result: NO GROWTH AFTER 17 HOURS       CULTURE, ANAEROBIC [[id number]] Collected:  05/27/18 1500    Order Status:  Completed Specimen:  Knee  Updated:  05/27/18 1753    CULTURE, STOOL [[id number]] Collected:  05/25/18 1745    Order Status:  Canceled Specimen:  Stool     OVA & PARASITES, STOOL [[id number]] Collected:  05/25/18 1745    Order Status:  Canceled Specimen:  Stool from Stool     C. DIFFICILE (DNA) [[id number]] Collected:  05/25/18 1745    Order Status:  Canceled     URINE CULTURE HOLD SAMPLE [[id number]] Collected:  05/24/18 2149    Order Status:  Completed Specimen:  Urine from Serum Updated:  05/24/18 2157     Urine culture hold         URINE ON HOLD IN MICROBIOLOGY DEPT FOR 3 DAYS. IF UNPRESERVED URINE IS SUBMITTED, IT CANNOT BE USED FOR ADDITIONAL TESTING AFTER 24 HRS, RECOLLECTION WILL BE REQUIRED. I have reviewed notes of prior 24hr. Other pertinent lab:       Total time spent with patient: [place] 59 discussed with: Patient, Nursing Staff and >50% of time spent in counseling and coordination of care    Discussed:  Care Plan    Prophylaxis:  Lovenox    Disposition:  Home w/Family           ___________________________________________________    Attending Physician: Kasey Flynn MD

## 2018-05-28 NOTE — ROUTINE PROCESS
Bedside and Verbal shift change report given to Pawan Tariq (oncoming nurse) by Pérez Carrizales RN (offgoing nurse). Report included the following information SBAR, Kardex, MAR, Accordion and Recent Results.

## 2018-05-28 NOTE — PROGRESS NOTES
Bedside and Verbal shift change report given to Madina Bobby  (oncoming nurse) by Pierre Fleischer  (offgoing nurse). Report included the following information SBAR and Kardex.

## 2018-05-28 NOTE — PROGRESS NOTES
Ortho Progress Note        S:  C/o severe L knee pain. Denies Numbness and tingling. States \"twisted\" his L knee the other day which onset the pain. +hx of gout in this knee. O:  I examined pt's L knee. Moderate effusion noted. No ecchymosis, mild erythema anterior and inferior to patella. No fluctuance over the pre patella bursa. Pain hinders extension and flexion of the knee. NVI distally BLE's. DP pulses + BLE's. Sensation intact BLE's. A:  L knee chondral defect  Gout L knee    P:  Pt. Aspiration cell count and total presentation not consistent with septic joint. +crystals in aspiration. Likely chondral injury causing hemarthrosis and gouty presentation. With colitis, I would refrain from Cortisone injection. Dr. Rosa Mayer ok with Indocin. We will continue to follow. Cultures negative. Dr. Jyotsna Tilley agrees with plan. Thank you for allowing us to take part in this patients care.     JENNIFER Camacho-C  Orthopaedic Surgery PA

## 2018-05-28 NOTE — PROGRESS NOTES
Bernardo Cortez M.D.  [phone]           GI PROGRESS NOTE        NAME: Manuella Rinne III   :  1975   MRN:  [id number]       Subjective:   Reports less abdominal pain, especially after he had a bowel movement today with loose stools. Denies any bleeding. Tolerated full liquid diet. Denies nausea or vomiting. Indocin helped his knee pain. Objective: In pain      VITALS:   Last 24hrs VS reviewed since prior progress note. Most recent are:  Visit Vitals    /72 (BP 1 Location: Left arm, BP Patient Position: At rest)    Pulse (!) 102    Temp 99 °F (37.2 °C)    Resp 18    Ht 6' (1.829 m)    Wt 98.9 kg (218 lb)    SpO2 94%    BMI 29.57 kg/m2       Intake/Output Summary (Last 24 hours) at 18 1610  Last data filed at 18 1526   Gross per 24 hour   Intake                0 ml   Output             3200 ml   Net            -3200 ml       PHYSICAL EXAM:  General: Alert, in no acute distress    HEENT: Anicteric sclerae. Lungs:            CTA Bilaterally.    Heart:  Regular  rhythm,    Abdomen: Soft, non distended, minimally tender, (+)Bowel sounds  Extremities: Tender and swollen left knee      Lab Data Reviewed:   Recent Labs      189  18   WBC  14.5*  16.5*   HGB  12.3  13.7   HCT  35.8*  39.3   PLT  251  221     Recent Labs      189  18   NA  133*  132*   K  3.4*  3.6   CL  97  97   CO2  23  24   BUN  7  6   CREA  0.84  0.92   GLU  147*  155*   PHOS  2.8  2.9   CA  8.2*  8.2*     Recent Labs      18   0448   SGOT  19  41*   AP  62  56   TP  7.2  6.6   ALB  2.8*  3.0*   GLOB  4.4*  3.6       ________________________________________________________________________  Patient Active Problem List   Diagnosis Code    Colitis K52.9    HTN (hypertension) I10    Gout M10.9    Hyperglycemia R73.9    Transaminitis R74.0    Inguinal hernia K40.90    Hyponatremia E87.1    Hepatic steatosis K76.0    Diverticulitis K57.92         Assessment and Plan:  Acute colitis, likely infectious, clinically much improved on IV antibiotics. Continue with full liquid diet today and hope to advance diet in am and if he continues to do well he can be discharged home from GI standpoint. Will need outpatient follow-up   No GI contraindication for steroid injection if needed.        Signed By: Divine Winters MD     5/28/2018  4:14 PM

## 2018-05-29 LAB
ANION GAP SERPL CALC-SCNC: 10 MMOL/L (ref 5–15)
BASOPHILS # BLD: 0 K/UL (ref 0–0.1)
BASOPHILS NFR BLD: 0 % (ref 0–1)
BUN SERPL-MCNC: 7 MG/DL (ref 6–20)
BUN/CREAT SERPL: 9 (ref 12–20)
CALCIUM SERPL-MCNC: 8.2 MG/DL (ref 8.5–10.1)
CHLORIDE SERPL-SCNC: 101 MMOL/L (ref 97–108)
CO2 SERPL-SCNC: 24 MMOL/L (ref 21–32)
CREAT SERPL-MCNC: 0.75 MG/DL (ref 0.7–1.3)
DIFFERENTIAL METHOD BLD: ABNORMAL
EOSINOPHIL # BLD: 0.1 K/UL (ref 0–0.4)
EOSINOPHIL NFR BLD: 1 % (ref 0–7)
ERYTHROCYTE [DISTWIDTH] IN BLOOD BY AUTOMATED COUNT: 11.6 % (ref 11.5–14.5)
GLUCOSE BLD STRIP.AUTO-MCNC: 138 MG/DL (ref 65–100)
GLUCOSE BLD STRIP.AUTO-MCNC: 139 MG/DL (ref 65–100)
GLUCOSE BLD STRIP.AUTO-MCNC: 184 MG/DL (ref 65–100)
GLUCOSE BLD STRIP.AUTO-MCNC: 223 MG/DL (ref 65–100)
GLUCOSE SERPL-MCNC: 125 MG/DL (ref 65–100)
HCT VFR BLD AUTO: 33.5 % (ref 36.6–50.3)
HGB BLD-MCNC: 11.6 G/DL (ref 12.1–17)
IMM GRANULOCYTES # BLD: 0.1 K/UL (ref 0–0.04)
IMM GRANULOCYTES NFR BLD AUTO: 1 % (ref 0–0.5)
LYMPHOCYTES # BLD: 1.4 K/UL (ref 0.8–3.5)
LYMPHOCYTES NFR BLD: 14 % (ref 12–49)
MCH RBC QN AUTO: 31.9 PG (ref 26–34)
MCHC RBC AUTO-ENTMCNC: 34.6 G/DL (ref 30–36.5)
MCV RBC AUTO: 92 FL (ref 80–99)
MONOCYTES # BLD: 0.9 K/UL (ref 0–1)
MONOCYTES NFR BLD: 9 % (ref 5–13)
NEUTS SEG # BLD: 7.6 K/UL (ref 1.8–8)
NEUTS SEG NFR BLD: 76 % (ref 32–75)
NRBC # BLD: 0 K/UL (ref 0–0.01)
NRBC BLD-RTO: 0 PER 100 WBC
PLATELET # BLD AUTO: 259 K/UL (ref 150–400)
PMV BLD AUTO: 9 FL (ref 8.9–12.9)
POTASSIUM SERPL-SCNC: 3.4 MMOL/L (ref 3.5–5.1)
RBC # BLD AUTO: 3.64 M/UL (ref 4.1–5.7)
SERVICE CMNT-IMP: ABNORMAL
SODIUM SERPL-SCNC: 135 MMOL/L (ref 136–145)
WBC # BLD AUTO: 10 K/UL (ref 4.1–11.1)

## 2018-05-29 PROCEDURE — 82962 GLUCOSE BLOOD TEST: CPT

## 2018-05-29 PROCEDURE — 74011636637 HC RX REV CODE- 636/637: Performed by: INTERNAL MEDICINE

## 2018-05-29 PROCEDURE — 74011250637 HC RX REV CODE- 250/637: Performed by: INTERNAL MEDICINE

## 2018-05-29 PROCEDURE — 80048 BASIC METABOLIC PNL TOTAL CA: CPT | Performed by: INTERNAL MEDICINE

## 2018-05-29 PROCEDURE — 36415 COLL VENOUS BLD VENIPUNCTURE: CPT | Performed by: INTERNAL MEDICINE

## 2018-05-29 PROCEDURE — 74011000250 HC RX REV CODE- 250: Performed by: INTERNAL MEDICINE

## 2018-05-29 PROCEDURE — 74011250636 HC RX REV CODE- 250/636: Performed by: INTERNAL MEDICINE

## 2018-05-29 PROCEDURE — 65270000029 HC RM PRIVATE

## 2018-05-29 PROCEDURE — 85025 COMPLETE CBC W/AUTO DIFF WBC: CPT | Performed by: INTERNAL MEDICINE

## 2018-05-29 PROCEDURE — 74011250637 HC RX REV CODE- 250/637: Performed by: PHYSICIAN ASSISTANT

## 2018-05-29 RX ORDER — POTASSIUM CHLORIDE 750 MG/1
40 TABLET, FILM COATED, EXTENDED RELEASE ORAL
Status: COMPLETED | OUTPATIENT
Start: 2018-05-29 | End: 2018-05-29

## 2018-05-29 RX ADMIN — SODIUM CHLORIDE 125 ML/HR: 900 INJECTION, SOLUTION INTRAVENOUS at 08:56

## 2018-05-29 RX ADMIN — OXYCODONE HYDROCHLORIDE AND ACETAMINOPHEN 2 TABLET: 5; 325 TABLET ORAL at 22:32

## 2018-05-29 RX ADMIN — INSULIN LISPRO 2 UNITS: 100 INJECTION, SOLUTION INTRAVENOUS; SUBCUTANEOUS at 12:41

## 2018-05-29 RX ADMIN — Medication 10 ML: at 05:21

## 2018-05-29 RX ADMIN — HYDROMORPHONE HYDROCHLORIDE 1 MG: 2 INJECTION INTRAMUSCULAR; INTRAVENOUS; SUBCUTANEOUS at 18:46

## 2018-05-29 RX ADMIN — LEVOFLOXACIN 500 MG: 5 INJECTION, SOLUTION INTRAVENOUS at 22:32

## 2018-05-29 RX ADMIN — Medication 10 ML: at 14:26

## 2018-05-29 RX ADMIN — HEPARIN SODIUM 5000 UNITS: 5000 INJECTION, SOLUTION INTRAVENOUS; SUBCUTANEOUS at 05:21

## 2018-05-29 RX ADMIN — METRONIDAZOLE 500 MG: 500 INJECTION, SOLUTION INTRAVENOUS at 21:26

## 2018-05-29 RX ADMIN — PANTOPRAZOLE SODIUM 40 MG: 40 TABLET, DELAYED RELEASE ORAL at 08:56

## 2018-05-29 RX ADMIN — INDOMETHACIN 25 MG: 25 CAPSULE ORAL at 08:56

## 2018-05-29 RX ADMIN — COLCHICINE 0.6 MG: 0.6 CAPSULE ORAL at 08:56

## 2018-05-29 RX ADMIN — INDOMETHACIN 25 MG: 25 CAPSULE ORAL at 12:41

## 2018-05-29 RX ADMIN — HEPARIN SODIUM 5000 UNITS: 5000 INJECTION, SOLUTION INTRAVENOUS; SUBCUTANEOUS at 14:26

## 2018-05-29 RX ADMIN — POTASSIUM CHLORIDE 40 MEQ: 750 TABLET, EXTENDED RELEASE ORAL at 10:11

## 2018-05-29 RX ADMIN — HEPARIN SODIUM 5000 UNITS: 5000 INJECTION, SOLUTION INTRAVENOUS; SUBCUTANEOUS at 21:24

## 2018-05-29 RX ADMIN — Medication 10 ML: at 22:38

## 2018-05-29 RX ADMIN — METRONIDAZOLE 500 MG: 500 INJECTION, SOLUTION INTRAVENOUS at 08:56

## 2018-05-29 RX ADMIN — INDOMETHACIN 25 MG: 25 CAPSULE ORAL at 17:07

## 2018-05-29 RX ADMIN — HYDROMORPHONE HYDROCHLORIDE 1 MG: 2 INJECTION INTRAMUSCULAR; INTRAVENOUS; SUBCUTANEOUS at 14:26

## 2018-05-29 RX ADMIN — OXYCODONE HYDROCHLORIDE AND ACETAMINOPHEN 2 TABLET: 5; 325 TABLET ORAL at 08:56

## 2018-05-29 NOTE — PROGRESS NOTES
Rounded on [facility] patients and provided Anointing of the Sick  at request of patient    Fr. Radhames Antoine

## 2018-05-29 NOTE — PROGRESS NOTES
Bedside and Verbal shift change report given to Charles Ramirez (oncoming nurse) by Mk Cunningham (offgoing nurse). Report included the following information SBAR, Kardex, Intake/Output, MAR, Accordion and Recent Results.

## 2018-05-29 NOTE — PROGRESS NOTES
Romero Coleman M.D.  [phone]           GI PROGRESS NOTE        NAME: Zelalem Navarro III   :  1975   MRN:  [id number]       Subjective:   Pain and diarrhea better  Tolerating full liquids well and would like to try advancing his diet     Objective:         VITALS:   Last 24hrs VS reviewed since prior progress note. Most recent are:  Visit Vitals    /79 (BP 1 Location: Right arm, BP Patient Position: At rest)    Pulse 93    Temp 98.1 °F (36.7 °C)    Resp 18    Ht 6' (1.829 m)    Wt 98.9 kg (218 lb)    SpO2 97%    BMI 29.57 kg/m2       Intake/Output Summary (Last 24 hours) at 18 9700  Last data filed at 18   Gross per 24 hour   Intake                0 ml   Output             1350 ml   Net            -1350 ml       PHYSICAL EXAM:  General: Alert, in no acute distress    HEENT: Anicteric sclerae. Lungs:            CTA Bilaterally.    Heart:  Regular  rhythm,    Abdomen: Soft, non distended, mild tenderness, (+)Bowel sounds  Extremities: Tender and swollen left knee      Lab Data Reviewed:   Recent Labs      18   WBC  10.0  14.5*   HGB  11.6*  12.3   HCT  33.5*  35.8*   PLT  259  251     Recent Labs      18   02418   NA  135*  133*  132*   K  3.4*  3.4*  3.6   CL  101  97  97   CO2  24  23  24   BUN  7  7  6   CREA  0.75  0.84  0.92   GLU  125*  147*  155*   PHOS   --   2.8  2.9   CA  8.2*  8.2*  8.2*     Recent Labs      18   SGOT  19   AP  62   TP  7.2   ALB  2.8*   GLOB  4.4*       ________________________________________________________________________  Patient Active Problem List   Diagnosis Code    Colitis K52.9    HTN (hypertension) I10    Gout M10.9    Hyperglycemia R73.9    Transaminitis R74.0    Inguinal hernia K40.90    Hyponatremia E87.1    Hepatic steatosis K76.0    Diverticulitis K57.92         Assessment and Plan:  Acute colitis, likely infectious, clinically much improved on IV antibiotics. Okay to advance to GI soft and then as tolerated  If he continues to do well he can be discharged home from GI standpoint.   Will need outpatient follow-up in a few weeks following discharge    Will follow with you       Signed By: Kyle Meza MD     5/29/2018  4:14 PM

## 2018-05-29 NOTE — PROGRESS NOTES
[facility] [address]  [address] [facility], [place], [address] [place] [address]  [phone]      Medical Progress Note      NAME: Marielos Moore III   :  1975  MRM:  [id number]    Date/Time: 2018  11:45 AM       Assessment and Plan:   1.  Colitis (2018). Continue IV Levaquin and flagyl. Advanced diet. Pain management. Appreciated GI and gen surg input.      2.  LT side Inguinal hernia / Meckle's Diverticulum. General Surgery consult appreciated. Will follow-up outpatient for elective surgery.      3.  HTN (hypertension) (2018). Pt is not on BP meds. BP stable. Cont hydralazine PRN.     4.  Hyperglycemia (2018) c/w new onset diabetes mellitus type 2. Pt denies Hx of DM but A1c is 8.3. Will start SSI and FSBG. Will need Rx for meter, strips, lancets, and metformin on discharge. Addressed importance of PCP establishment with patient, has list at bedside.      5.  Transaminitis / Hepatic steatosis (2018). Continue to monitor LFTs.      6.  Hyponatremia (2018), mild. Likely due to volume depletion. Continue IVF       7.  Gout (2018). With acute flare of left knee. Started on indocin. S/P arthrocentesis and positive for crystals.              Subjective:     Chief Complaint:  Follow up of pt who was admitted with colitis. Better abdominal pain and LT knee pain     ROS:  (bold if positive, if negative)     Abd Pain  Tolerating PT  Tolerating Diet        Objective:     Last 24hrs VS reviewed since prior progress note.  Most recent are:    Visit Vitals    /79 (BP 1 Location: Right arm, BP Patient Position: At rest)    Pulse 93    Temp 98.1 °F (36.7 °C)    Resp 18    Ht 6' (1.829 m)    Wt 98.9 kg (218 lb)    SpO2 97%    BMI 29.57 kg/m2     SpO2 Readings from Last 6 Encounters:   18 97%   16 94%            Intake/Output Summary (Last 24 hours) at 18 0918  Last data filed at 18 0904   Gross per 24 hour   Intake                0 ml   Output 2175 ml   Net            -2175 ml        Physical Exam:    Gen:  Well-developed, well-nourished, in no acute distress  HEENT:  Pink conjunctivae, PERRL, hearing intact to voice, moist mucous membranes  Neck:  Supple, without masses, thyroid non-tender  Resp:  No accessory muscle use, clear breath sounds without wheezes rales or rhonchi  Card:  No murmurs, normal S1, S2 without thrills, bruits or peripheral edema  Abd:  Soft, non-tender, non-distended, normoactive bowel sounds are present, no palpable organomegaly and no detectable hernias  Lymph:  No cervical or inguinal adenopathy  Musc:  No cyanosis or clubbing  Skin:  No rashes or ulcers, skin turgor is good  Neuro:  Cranial nerves are grossly intact, no focal motor weakness, follows commands appropriately  Psych:  Good insight, oriented to person, place and time, alert  __________________________________________________________________  Medications Reviewed: (see below)  Medications:     Current Facility-Administered Medications   Medication Dose Route Frequency    potassium chloride SR (KLOR-CON 10) tablet 40 mEq  40 mEq Oral NOW    indomethacin (INDOCIN) capsule 25 mg  25 mg Oral TID WITH MEALS    colchicine (MITIGARE) capsule 0.6 mg  0.6 mg Oral DAILY    metroNIDAZOLE (FLAGYL) IVPB premix 500 mg  500 mg IntraVENous Q12H    levoFLOXacin (LEVAQUIN) 500 mg in D5W IVPB  500 mg IntraVENous Q24H    HYDROmorphone (DILAUDID) injection 1 mg  1 mg IntraVENous Q3H PRN    oxyCODONE-acetaminophen (PERCOCET) 5-325 mg per tablet 2 Tab  2 Tab Oral Q6H PRN    pantoprazole (PROTONIX) tablet 40 mg  40 mg Oral DAILY    sodium chloride (NS) flush 5-10 mL  5-10 mL IntraVENous Q8H    sodium chloride (NS) flush 5-10 mL  5-10 mL IntraVENous PRN    heparin (porcine) injection 5,000 Units  5,000 Units SubCUTAneous Q8H    glucose chewable tablet 16 g  4 Tab Oral PRN    dextrose (D50W) injection syrg 12.5-25 g  12.5-25 g IntraVENous PRN    glucagon (GLUCAGEN) injection 1 mg  1 mg IntraMUSCular PRN    insulin lispro (HUMALOG) injection   SubCUTAneous AC&HS    hydrALAZINE (APRESOLINE) 20 mg/mL injection 10 mg  10 mg IntraVENous Q6H PRN        Lab Data Reviewed: (see below)  Lab Review:     Recent Labs      05/29/18   0241  05/28/18 0459 05/27/18 0518   WBC  10.0  14.5*  16.5*   HGB  11.6*  12.3  13.7   HCT  33.5*  35.8*  39.3   PLT  259  251  221     Recent Labs      05/29/18   0241 05/28/18 0459 05/27/18 0518   NA  135*  133*  132*   K  3.4*  3.4*  3.6   CL  101  97  97   CO2  24  23  24   GLU  125*  147*  155*   BUN  7  7  6   CREA  0.75  0.84  0.92   CA  8.2*  8.2*  8.2*   MG   --   1.8  1.4*   PHOS   --   2.8  2.9   ALB   --    --   2.8*   TBILI   --    --   1.4*   SGOT   --    --   19   ALT   --    --   39     Lab Results   Component Value Date/Time    Glucose (POC) 138 (H) 05/29/2018 06:54 AM    Glucose (POC) 172 (H) 05/28/2018 09:19 PM    Glucose (POC) 242 (H) 05/28/2018 04:17 PM    Glucose (POC) 133 (H) 05/28/2018 11:42 AM    Glucose (POC) 156 (H) 05/28/2018 06:59 AM     No results for input(s): PH, PCO2, PO2, HCO3, FIO2 in the last 72 hours. No results for input(s): INR in the last 72 hours. No lab exists for component: INREXT, INREXT  All Micro Results     Procedure Component Value Units Date/Time    CULTURE, BODY FLUID Ranulfo Agustin STAIN [[id number]] Collected:  05/27/18 1500    Order Status:  Completed Specimen:  Knee Fluid Updated:  05/29/18 0852     Special Requests: NO SPECIAL REQUESTS        GRAM STAIN 1+ WBCS SEEN         NO ORGANISMS SEEN        Culture result:         No growth thus far, holding 14 days. CULTURE, ANAEROBIC [[id number]] Collected:  05/27/18 1500    Order Status:  Completed Specimen:  Knee  Updated:  05/29/18 7152     Special Requests: NO SPECIAL REQUESTS        Culture result:         No growth thus far, holding 14 days.     CULTURE, STOOL [[id number]] Collected:  05/25/18 2370    Order Status:  Canceled Specimen:  Stool     OVA Kylie Roland [[id number]] Collected:  05/25/18 1745    Order Status:  Canceled Specimen:  Stool from Stool     C. DIFFICILE (DNA) [[id number]] Collected:  05/25/18 1745    Order Status:  Canceled     URINE CULTURE HOLD SAMPLE [[id number]] Collected:  05/24/18 2149    Order Status:  Completed Specimen:  Urine from Serum Updated:  05/24/18 2157     Urine culture hold         URINE ON HOLD IN MICROBIOLOGY DEPT FOR 3 DAYS. IF UNPRESERVED URINE IS SUBMITTED, IT CANNOT BE USED FOR ADDITIONAL TESTING AFTER 24 HRS, RECOLLECTION WILL BE REQUIRED. I have reviewed notes of prior 24hr. Other pertinent lab:       Total time spent with patient: [address] discussed with: Patient, Nursing Staff and >50% of time spent in counseling and coordination of care    Discussed:  Care Plan    Prophylaxis:  Lovenox    Disposition:  Home w/Family           ___________________________________________________    Attending Physician: Tate Prince MD

## 2018-05-29 NOTE — PROGRESS NOTES
Orthopaedic Progress Note  Post Op day: * No surgery found *    May 29, 2018 11:47 AM     Patient: Kellie Stevens MRN: [id number]  SSN: [id number]    YOB: 1975  Age: 38 y.o. Sex: male      Admit date:  2018  Date of Surgery:  * No surgery found *   Procedures:    Admitting Physician:  Thomas Vu MD   Surgeon:  * Surgery not found *    Consulting Physician(s): Treatment Team: Attending Provider: Thomas Vu MD; Consulting Provider: Jose Franklin MD; Consulting Provider: Sandeep Prabhakar MD; Care Manager: Nicci Lloyd; Utilization Review: Nas Garcia RN; Consulting Provider: Nataly Thorne NP    SUBJECTIVE:     Kellie Stevens is a 38 y.o. admitted with coliitis/ WBC improving/ temps improving/ diet advanced. Pt states left knee pain is improving, however still painful Pt rates pain a '7' on pain scale today compared to a \"10\" on  prior to the aspiration. He has been started on Indocin. No complaints of nausea, vomiting, dizziness, lightheadedness, chest pain, or shortness of breath. OBJECTIVE:       Physical Exam:  General: Alert, cooperative, no distress. Respiratory: Respirations unlabored  Neurological:  Neurovascular exam within normal limits. Motor: + DF/PF. Musculoskeletal: Calves soft, supple, non-tender upon palpation. Left knee with moderate swelling. Less tender to palpation. Still with pain with ROM of left knee. Lacks about 5 degrees of full extension/ flexion to 80 degrees with slightly improved. Dressing/Wound:  Clean, dry and intact. No significant erythema or swelling.       Vital Signs:      Patient Vitals for the past 8 hrs:   BP Temp Pulse Resp SpO2   18 1111 126/85 98.2 °F (36.8 °C) 86 18 96 %   18 0749 124/79 98.1 °F (36.7 °C) 93 18 97 %                                          Temp (24hrs), Av.1 °F (36.7 °C), Min:98 °F (36.7 °C), Max:98.2 °F (36.8 °C)      Labs:        Recent Labs      18   7575 HCT  33.5*   HGB  11.6*     Lab Results   Component Value Date/Time    Sodium 135 (L) 05/29/2018 02:41 AM    Potassium 3.4 (L) 05/29/2018 02:41 AM    Chloride 101 05/29/2018 02:41 AM    CO2 24 05/29/2018 02:41 AM    Glucose 125 (H) 05/29/2018 02:41 AM    BUN 7 05/29/2018 02:41 AM    Creatinine 0.75 05/29/2018 02:41 AM    Calcium 8.2 (L) 05/29/2018 02:41 AM       PT/OT:                Patient mobility                         ASSESSMENT / PLAN:   Active Problems:    Colitis (5/24/2018)      HTN (hypertension) (5/24/2018)      Gout (5/24/2018)      Hyperglycemia (5/24/2018)      Transaminitis (5/24/2018)      Inguinal hernia (5/24/2018)      Hyponatremia (5/24/2018)      Hepatic steatosis (5/24/2018)      Diverticulitis (5/24/2018)              Left knee with gout/ inflammatory response; on Indocin, pain is improving slightly. Cultures negative so far   Still with pain with ambulating- will have his try knee immobilizer to ambulate to improve pain    Pain Control: Adequate with oral agents and Indocin. DVT Prophylaxis: Lovenox daily, SCDs, IDANA Hose    Therapy/ Weight bearing: WBAT; try with knee immobilizer   Medical concerns:    Disposition: TBD       Updated Dr. Tracie Feliciano.      Signed By:  Mirella Salinas NP    [facility] [place]  [facility]

## 2018-05-29 NOTE — PROGRESS NOTES
5/29/2018  2:54 PM  Case management note    No new CM needs identified.  Will continue to follow  Chacho Donahue, [address]

## 2018-05-30 ENCOUNTER — APPOINTMENT (OUTPATIENT)
Dept: GENERAL RADIOLOGY | Age: 43
DRG: 392 | End: 2018-05-30
Attending: ORTHOPAEDIC SURGERY
Payer: COMMERCIAL

## 2018-05-30 LAB
GLUCOSE BLD STRIP.AUTO-MCNC: 122 MG/DL (ref 65–100)
GLUCOSE BLD STRIP.AUTO-MCNC: 144 MG/DL (ref 65–100)
GLUCOSE BLD STRIP.AUTO-MCNC: 189 MG/DL (ref 65–100)
GLUCOSE BLD STRIP.AUTO-MCNC: 236 MG/DL (ref 65–100)
SERVICE CMNT-IMP: ABNORMAL

## 2018-05-30 PROCEDURE — 82962 GLUCOSE BLOOD TEST: CPT

## 2018-05-30 PROCEDURE — 74011250637 HC RX REV CODE- 250/637: Performed by: INTERNAL MEDICINE

## 2018-05-30 PROCEDURE — 74011636637 HC RX REV CODE- 636/637: Performed by: INTERNAL MEDICINE

## 2018-05-30 PROCEDURE — 73562 X-RAY EXAM OF KNEE 3: CPT

## 2018-05-30 PROCEDURE — 74011250636 HC RX REV CODE- 250/636: Performed by: INTERNAL MEDICINE

## 2018-05-30 PROCEDURE — 74011000250 HC RX REV CODE- 250: Performed by: INTERNAL MEDICINE

## 2018-05-30 PROCEDURE — 74011250637 HC RX REV CODE- 250/637: Performed by: PHYSICIAN ASSISTANT

## 2018-05-30 PROCEDURE — 65270000029 HC RM PRIVATE

## 2018-05-30 RX ADMIN — METRONIDAZOLE 500 MG: 500 INJECTION, SOLUTION INTRAVENOUS at 20:28

## 2018-05-30 RX ADMIN — COLCHICINE 0.6 MG: 0.6 CAPSULE ORAL at 10:26

## 2018-05-30 RX ADMIN — INDOMETHACIN 25 MG: 25 CAPSULE ORAL at 16:28

## 2018-05-30 RX ADMIN — LEVOFLOXACIN 500 MG: 5 INJECTION, SOLUTION INTRAVENOUS at 20:37

## 2018-05-30 RX ADMIN — HEPARIN SODIUM 5000 UNITS: 5000 INJECTION, SOLUTION INTRAVENOUS; SUBCUTANEOUS at 20:33

## 2018-05-30 RX ADMIN — METRONIDAZOLE 500 MG: 500 INJECTION, SOLUTION INTRAVENOUS at 09:17

## 2018-05-30 RX ADMIN — INSULIN LISPRO 2 UNITS: 100 INJECTION, SOLUTION INTRAVENOUS; SUBCUTANEOUS at 12:35

## 2018-05-30 RX ADMIN — OXYCODONE HYDROCHLORIDE AND ACETAMINOPHEN 2 TABLET: 5; 325 TABLET ORAL at 22:46

## 2018-05-30 RX ADMIN — OXYCODONE HYDROCHLORIDE AND ACETAMINOPHEN 2 TABLET: 5; 325 TABLET ORAL at 04:11

## 2018-05-30 RX ADMIN — HEPARIN SODIUM 5000 UNITS: 5000 INJECTION, SOLUTION INTRAVENOUS; SUBCUTANEOUS at 14:43

## 2018-05-30 RX ADMIN — Medication 10 ML: at 14:43

## 2018-05-30 RX ADMIN — HYDROMORPHONE HYDROCHLORIDE 1 MG: 2 INJECTION INTRAMUSCULAR; INTRAVENOUS; SUBCUTANEOUS at 20:28

## 2018-05-30 RX ADMIN — HEPARIN SODIUM 5000 UNITS: 5000 INJECTION, SOLUTION INTRAVENOUS; SUBCUTANEOUS at 05:52

## 2018-05-30 RX ADMIN — Medication 10 ML: at 22:00

## 2018-05-30 RX ADMIN — HYDROMORPHONE HYDROCHLORIDE 1 MG: 2 INJECTION INTRAMUSCULAR; INTRAVENOUS; SUBCUTANEOUS at 16:36

## 2018-05-30 RX ADMIN — INDOMETHACIN 25 MG: 25 CAPSULE ORAL at 12:35

## 2018-05-30 RX ADMIN — HYDROMORPHONE HYDROCHLORIDE 1 MG: 2 INJECTION INTRAMUSCULAR; INTRAVENOUS; SUBCUTANEOUS at 09:10

## 2018-05-30 RX ADMIN — PANTOPRAZOLE SODIUM 40 MG: 40 TABLET, DELAYED RELEASE ORAL at 09:14

## 2018-05-30 RX ADMIN — INDOMETHACIN 25 MG: 25 CAPSULE ORAL at 09:14

## 2018-05-30 RX ADMIN — Medication 10 ML: at 05:52

## 2018-05-30 NOTE — DIABETES MGMT
Progress Note     Chart reviewed for elevated blood glucose ( > 180 mg/dL x 2 in the past 24 hours). Recommendations/ Comments: If appropriate, please consider adding diabetic restrictions to current diet order. Morning glucose: 122 mg/dL (per am POCT Glucose). Required 4 units of correction in the last 24 hours. Inpatient medications for glucose management:  1. Correction Scale: Lispro (Humalog) High Sensitivity scale (thin, ESRD) to cover for glucose > 199 mg/dL  before meals and for glucose >199 at bedtime      POC Glucose last 24hrs:   Lab Results   Component Value Date/Time    GLUCPOC 236 (H) 05/30/2018 11:31 AM    GLUCPOC 122 (H) 05/30/2018 06:47 AM    GLUCPOC 184 (H) 05/29/2018 09:19 PM        Estimated Creatinine Clearance: 153.1 mL/min (based on Cr of 0.75). Diet order:   Active Orders   Diet    DIET GI LITE (POST SURGICAL)        PO intake: No data found. History of Diabetes:   Juan Banks is a 38 y.o. male with no past medical history significant for DM per  Tyler August MD's H&P dated 5/24/2018. Prior to admission medications for glucose management: per past medical records  -none for DM    A1C:   Lab Results   Component Value Date/Time    Hemoglobin A1c 8.3 (H) 05/24/2018 06:47 PM    Hemoglobin A1c 8.4 (H) 12/14/2016 07:53 PM       Reference range*:  Increased risk for diabetes: 5.7 - 6.4%  Diabetes: >6.4%  Glycemic control for adults with diabetes: <7.0 %    *SCAR GOODSON (2014). Diagnosis and classification of diabetes mellitus. Diabetes care, 37, S81. Thank you. Hugo Melendez. Vangie MPH, RN, BSN, [address]   [phone]    -A target glucose range of 140180 mg/dL (7.810.0 mmol/L) is recommended for the majority of critically ill patients and noncritically ill patients. -More stringent goals, such as 110140 mg/dL (6.17.8 mmol/L), may be appropriate for selected patients, if this can be achieved without significant hypoglycemia. *    *American Diabetes Association. 14. Diabetes care in the hospital:Standards of Medical Care in [id number]. Diabetes Care 2018;41(Suppl.  1):S146S110

## 2018-05-30 NOTE — PROGRESS NOTES
Spiritual Care Assessment/Progress Note  [address]      NAME: Rafael Garcia III      MRN: [id number]  AGE: 38 y.o.  SEX: male  [place]ruze Affiliation: Congregation   Language: English     5/30/2018     Total Time (in minutes): 8     Spiritual Assessment begun in [facility]  MED SURG 2 through conversation with:         [x]Patient        [] Family    [] Friend(s)        Reason for Consult: Initial/Spiritual assessment, patient floor     Spiritual beliefs: (Please include comment if needed)     [x] Identifies with a levon tradition: Congregation     [] Supported by a levon community:      [] Claims no spiritual orientation:      [] Seeking spiritual identity:           [] Adheres to an individual form of spirituality:      [] Not able to assess:                     Identified resources for coping:      [] Prayer                               [] Music                  [] Guided Imagery     [x] Family/friends                 [] Pet visits     [] Devotional reading                         [] Unknown     [] Other:                                               Interventions offered during this visit: (See comments for more details)    Patient Interventions: Initial/Spiritual assessment, patient floor, Affirmation of emotions/emotional suffering, Affirmation of levon, Catharsis/review of pertinent events in supportive environment, Coping skills reviewed/reinforced, Normalization of emotional/spiritual concerns           Plan of Care:     [] Support spiritual and/or cultural needs    [] Support AMD and/or advance care planning process      [] Support grieving process   [] Coordinate Rites and/or Rituals    [] Coordination with community clergy   [] No spiritual needs identified at this time   [] Detailed Plan of Care below (See Comments)  [] Make referral to Music Therapy  [] Make referral to Pet Therapy     [] Make referral to Addiction services  [] Make referral to [facility]  [] Make referral to Spiritual Care Partner  [] No future visits requested        [x] Follow up visits as needed     Comments:  initiated visit for pt on Med Surg due to pt' length of stay. Pt shared that he has been feeling better and believes that his recovery is progressing well. Pt is optimistic for his near future and continued recovery. Pt shared that his wife and kids have been very supportive of him through this time.  affirmed his positivity and his supportive resources. Advised him of availability and assured him of continued support as needed/ desired. Lis Youngblood.[email or web address].   Spiritual Care Department  If needs rise please call [phone])

## 2018-05-30 NOTE — PROGRESS NOTES
Orthopaedic Progress Note  Post Op day: * No surgery found *    May 30, 2018 1:43 PM     Patient: Baljinder Mendoza MRN: [id number]  SSN: [id number]    YOB: 1975  Age: 38 y.o. Sex: male      Admit date:  2018  Date of Surgery:  * No surgery found *   Procedures:    Admitting Physician:  Yogesh Pimentel MD   Surgeon:  * Surgery not found *    Consulting Physician(s): Treatment Team: Attending Provider: Yogesh Pimentel MD; Consulting Provider: Jemma Guillermo MD; Consulting Provider: Oliver Perera MD; Care Manager: Mindy Sloan; Utilization Review: Kassie Mcdowell RN; Consulting Provider: Domonique Paulino NP    SUBJECTIVE:     Baljinder Mendoza is a 38 y.o. male admitted with colitis with acute onset right knee pain. Pt is s/p arthrocentesis; cultures negative thus far. Nucleated cells in tap at 20K, + crystals. Pt was started on Indocin two days ago. Yesterday pain is slightly improved, today, pain is worse. Pt is unable to ambulate due to severity in pain. Appetite improving/ WBC improving. OBJECTIVE:       Physical Exam:  General: Alert, cooperative, no distress. Respiratory: Respirations unlabored  Neurological:  Neurovascular exam within normal limits. Motor: + DF/PF. Musculoskeletal: Left knee with large effusion, effusion larger than it was yesterday. Difficult to range knee. Pt lacks FROM. Passively can only get to 40 degree of flexion, extension lacks about 10 degrees. Dressing/Wound:  Clean, dry and intact. No significant erythema or swelling.       Vital Signs:      Patient Vitals for the past 8 hrs:   BP Temp Pulse Resp SpO2   18 1128 143/78 98.7 °F (37.1 °C) 84 18 96 %   18 0758 125/72 98.4 °F (36.9 °C) 73 18 97 %                                          Temp (24hrs), Av.1 °F (36.7 °C), Min:97.7 °F (36.5 °C), Max:98.7 °F (37.1 °C)      Labs:        Recent Labs      18   0241   HCT  33.5*   HGB  11.6*     Lab Results   Component Value Date/Time    Sodium 135 (L) 05/29/2018 02:41 AM    Potassium 3.4 (L) 05/29/2018 02:41 AM    Chloride 101 05/29/2018 02:41 AM    CO2 24 05/29/2018 02:41 AM    Glucose 125 (H) 05/29/2018 02:41 AM    BUN 7 05/29/2018 02:41 AM    Creatinine 0.75 05/29/2018 02:41 AM    Calcium 8.2 (L) 05/29/2018 02:41 AM       PT/OT:                Patient mobility                         ASSESSMENT / PLAN:   Active Problems:    Colitis (5/24/2018)      HTN (hypertension) (5/24/2018)      Gout (5/24/2018)      Hyperglycemia (5/24/2018)      Transaminitis (5/24/2018)      Inguinal hernia (5/24/2018)      Hyponatremia (5/24/2018)      Hepatic steatosis (5/24/2018)      Diverticulitis (5/24/2018)                    Pain Control: Adequate with oral agents and PRN IV narcotics. Patient on indocin, minimal relief of pain today. DVT Prophylaxis: Heparin Q8, SCDs, DIANA Hose    Therapy/ Weight bearing: WBAT. Pt having difficulty with weight bearing on right knee   A/P Colitis is improving. Left knee with inflammatory process/ gout. Cultures negative thus far. Pain is limiting his ambulation and potential discharge. Left message with Dr. Chapis De La Garza PA as Dr. Leana Ruvalcaba in surgery. To discuss further options for patient.         Signed By:  Hernan Tovar NP    [facility] [place]  [facility]

## 2018-05-30 NOTE — PROGRESS NOTES
[facility]  [address] [facility], [place], [address] [place] [address]  [phone]      Medical Progress Note      NAME: aMrlon Gracia III   :  1975  MRM:  [id number]    Date/Time: 2018  11:45 AM       Assessment and Plan:   1.  Colitis (2018). Continue IV Levaquin and flagyl. Advanced diet. Pain management. Appreciated GI and gen surg input.      2.  LT side Inguinal hernia / Meckle's Diverticulum. General Surgery consult appreciated. Will follow-up outpatient for elective surgery.      3.  HTN (hypertension) (2018). Pt is not on BP meds. BP stable. Cont hydralazine PRN.     4.  Hyperglycemia (2018) c/w new onset diabetes mellitus type 2. Pt denies Hx of DM but A1c is 8.3. Will start SSI and FSBG. Will need Rx for meter, strips, lancets, and metformin on discharge. Addressed importance of PCP establishment with patient, has list at bedside.      5.  Transaminitis / Hepatic steatosis (2018). Continue to monitor LFTs.      6.  Hyponatremia (2018), mild. Likely due to volume depletion. Continue IVF       7.  Gout (2018). With acute flare of left knee. Started on indocin. S/P arthrocentesis and positive for crystals.              Subjective:     Chief Complaint:  Follow up of pt who was admitted with colitis. worsening abdominal and LT knee pain     ROS:  (bold if positive, if negative)     Abd Pain  Tolerating PT  Tolerating Diet        Objective:     Last 24hrs VS reviewed since prior progress note.  Most recent are:    Visit Vitals    /72 (BP 1 Location: Right arm, BP Patient Position: At rest)    Pulse 73    Temp 98.4 °F (36.9 °C)    Resp 18    Ht 6' (1.829 m)    Wt 98.9 kg (218 lb)    SpO2 97%    BMI 29.57 kg/m2     SpO2 Readings from Last 6 Encounters:   18 97%   16 94%            Intake/Output Summary (Last 24 hours) at 18 0963  Last data filed at 18 0559   Gross per 24 hour   Intake                0 ml   Output 1325 ml   Net            -1325 ml        Physical Exam:    Gen:  Well-developed, well-nourished, in no acute distress  HEENT:  Pink conjunctivae, PERRL, hearing intact to voice, moist mucous membranes  Neck:  Supple, without masses, thyroid non-tender  Resp:  No accessory muscle use, clear breath sounds without wheezes rales or rhonchi  Card:  No murmurs, normal S1, S2 without thrills, bruits or peripheral edema  Abd:  Soft, non-tender, non-distended, normoactive bowel sounds are present, no palpable organomegaly and no detectable hernias  Lymph:  No cervical or inguinal adenopathy  Musc:  No cyanosis or clubbing  Skin:  No rashes or ulcers, skin turgor is good  Neuro:  Cranial nerves are grossly intact, no focal motor weakness, follows commands appropriately  Psych:  Good insight, oriented to person, place and time, alert  __________________________________________________________________  Medications Reviewed: (see below)  Medications:     Current Facility-Administered Medications   Medication Dose Route Frequency    indomethacin (INDOCIN) capsule 25 mg  25 mg Oral TID WITH MEALS    colchicine (MITIGARE) capsule 0.6 mg  0.6 mg Oral DAILY    metroNIDAZOLE (FLAGYL) IVPB premix 500 mg  500 mg IntraVENous Q12H    levoFLOXacin (LEVAQUIN) 500 mg in D5W IVPB  500 mg IntraVENous Q24H    HYDROmorphone (DILAUDID) injection 1 mg  1 mg IntraVENous Q3H PRN    oxyCODONE-acetaminophen (PERCOCET) 5-325 mg per tablet 2 Tab  2 Tab Oral Q6H PRN    pantoprazole (PROTONIX) tablet 40 mg  40 mg Oral DAILY    sodium chloride (NS) flush 5-10 mL  5-10 mL IntraVENous Q8H    sodium chloride (NS) flush 5-10 mL  5-10 mL IntraVENous PRN    heparin (porcine) injection 5,000 Units  5,000 Units SubCUTAneous Q8H    glucose chewable tablet 16 g  4 Tab Oral PRN    dextrose (D50W) injection syrg 12.5-25 g  12.5-25 g IntraVENous PRN    glucagon (GLUCAGEN) injection 1 mg  1 mg IntraMUSCular PRN    insulin lispro (HUMALOG) injection   SubCUTAneous AC&HS    hydrALAZINE (APRESOLINE) 20 mg/mL injection 10 mg  10 mg IntraVENous Q6H PRN        Lab Data Reviewed: (see below)  Lab Review:     Recent Labs      05/29/18   0241  05/28/18 0459   WBC  10.0  14.5*   HGB  11.6*  12.3   HCT  33.5*  35.8*   PLT  259  251     Recent Labs      05/29/18   0241  05/28/18 0459   NA  135*  133*   K  3.4*  3.4*   CL  101  97   CO2  24  23   GLU  125*  147*   BUN  7  7   CREA  0.75  0.84   CA  8.2*  8.2*   MG   --   1.8   PHOS   --   2.8     Lab Results   Component Value Date/Time    Glucose (POC) 122 (H) 05/30/2018 06:47 AM    Glucose (POC) 184 (H) 05/29/2018 09:19 PM    Glucose (POC) 139 (H) 05/29/2018 04:05 PM    Glucose (POC) 223 (H) 05/29/2018 11:10 AM    Glucose (POC) 138 (H) 05/29/2018 06:54 AM     No results for input(s): PH, PCO2, PO2, HCO3, FIO2 in the last 72 hours. No results for input(s): INR in the last 72 hours. No lab exists for component: INREXT, INREXT  All Micro Results     Procedure Component Value Units Date/Time    CULTURE, BODY FLUID Patricia Card STAIN [[id number]] Collected:  05/27/18 1500    Order Status:  Completed Specimen:  Knee Fluid Updated:  05/29/18 0852     Special Requests: NO SPECIAL REQUESTS        GRAM STAIN 1+ WBCS SEEN         NO ORGANISMS SEEN        Culture result:         No growth thus far, holding 14 days. CULTURE, ANAEROBIC [[id number]] Collected:  05/27/18 1500    Order Status:  Completed Specimen:  Knee  Updated:  05/29/18 4103     Special Requests: NO SPECIAL REQUESTS        Culture result:         No growth thus far, holding 14 days.     Razia Birch [[id number]] Collected:  05/25/18 1745    Order Status:  Canceled Specimen:  Stool     OVA & Candida Cramp [[id number]] Collected:  05/25/18 1745    Order Status:  Canceled Specimen:  Stool from Stool     C. DIFFICILE (DNA) [[id number]] Collected:  05/25/18 3445    Order Status:  Canceled     URINE CULTURE HOLD SAMPLE [[id number]] Collected:  05/24/18 6691 Order Status:  Completed Specimen:  Urine from Serum Updated:  05/24/18 2157     Urine culture hold         URINE ON HOLD IN MICROBIOLOGY DEPT FOR 3 DAYS. IF UNPRESERVED URINE IS SUBMITTED, IT CANNOT BE USED FOR ADDITIONAL TESTING AFTER 24 HRS, RECOLLECTION WILL BE REQUIRED. I have reviewed notes of prior 24hr. Other pertinent lab:       Total time spent with patient: Ondina 59 discussed with: Patient, Nursing Staff and >50% of time spent in counseling and coordination of care    Discussed:  Care Plan    Prophylaxis:  Lovenox    Disposition:  Home w/Family           ___________________________________________________    Attending Physician: Yogesh Pimentel MD

## 2018-05-30 NOTE — ROUTINE PROCESS
Bedside and Verbal shift change report given to Ondina Manning rn (oncoming nurse) by rogerio phillips rn (offgoing nurse). Report included the following information SBAR.

## 2018-05-30 NOTE — PROGRESS NOTES
5/30/2018  2:00 PM  Case Management Note    Patient was seen by ortho this am, possible procedure to be done.   Will continue to follow  Luis Gao, [address]

## 2018-05-30 NOTE — PROGRESS NOTES
Bedside and Verbal shift change report given to Dionicio Kovacs (oncoming nurse) by Donita Nielsen (offgoing nurse). Report included the following information SBAR, Kardex, MAR, Accordion and Recent Results.

## 2018-05-30 NOTE — PROGRESS NOTES
Bedside and Verbal shift change report given to [address] [place] [address] (oncoming nurse) by Suresh (offgoing nurse). Report included the following information SBAR, Kardex, Intake/Output, MAR, Accordion and Recent Results.

## 2018-05-30 NOTE — PROGRESS NOTES
ORTHOPEDIC CONSULTATION     SUBJECTIVE:     Jose Ramon Nascimento III is a 38 y.o. male  evaluated for left knee pain. The patient localizes their pain to medial and lateral knee. Intensity is noted to be severe. The symptoms began 4 days prior - admitted for Colitis and then developed knee pain . Other concerns include history of multiple gout attacks of the knees and ankle. He takes chronic Colchinine. Past Medical History :   Past Medical History:   Diagnosis Date    Gout     Hypertension        Past Surgical History :   Past Surgical History:   Procedure Laterality Date    HX WISDOM TEETH EXTRACTION          Medications :  See med reconciliation    Allergies :   Review of patient's allergies indicates no known allergies. Objective  Objective:     Vitals:  Patient Vitals for the past 12 hrs:   BP Temp Pulse Resp SpO2 Height Weight   05/30/18 1608 175/86 98.9 °F (37.2 °C) 76 18 98 % - -   05/30/18 1600 - - - - - 6' (1.829 m) 98.9 kg (218 lb 0.6 oz)   05/30/18 1128 143/78 98.7 °F (37.1 °C) 84 18 96 % - -   05/30/18 0758 125/72 98.4 °F (36.9 °C) 73 18 97 % - -       Alert and Oriented x 3, Dementia is not present. No Acute Respiratory Distress  Heart and Lung exam normal    Focused Physical Exam :   ROM deferred, intact extensor mechanism, intact motor and sensory exam  No Lymphadenopathy   Palpable pulses  No skin trophic changes    Labs :   Recent Labs      05/29/18   0241   HGB  11.6*   HCT  33.5*   NA  135*   K  3.4*   CL  101   CO2  24   BUN  7   CREA  0.75   GLU  125*     Cultures - Negative to date. Cell count consistent with gout. - Crystals seen on negatively birefringent light     ASSESSMENT :   Acute Gout of the left knee      Recommendations : Infection is much less likely, but possible. Continue to follow cultures. Repeat aspiration with steroid injection tomorrow if cultures remain negative.  I will order Left knee films and if there are any abnormalities then we can order a CT tomorrow. I provided reassurance, this is likely gout and should resolve quickly in time. Thank you for allowing to participate in this patients care. Please do not hesitate to contact my office or page me with any questions or concerns.               Vic Serrano MD  Cell [phone]  Chris Kuhn PA-C  Cell [phone]  Medical Staff : Pilar Malhotra  Office : [phone]

## 2018-05-30 NOTE — PROGRESS NOTES
[address] NP  [phone]           GI PROGRESS NOTE        NAME: Mary Carmen Barrios III   :  1975   MRN:  [id number]       Subjective:   Reports some watery diarrhea this morning. Reports some tenderness in RUQ. Objective:         VITALS:   Last 24hrs VS reviewed since prior progress note. Most recent are:  Visit Vitals    /72 (BP 1 Location: Right arm, BP Patient Position: At rest)    Pulse 73    Temp 98.4 °F (36.9 °C)    Resp 18    Ht 6' (1.829 m)    Wt 98.9 kg (218 lb)    SpO2 97%    BMI 29.57 kg/m2       Intake/Output Summary (Last 24 hours) at 18 1054  Last data filed at 18 0559   Gross per 24 hour   Intake                0 ml   Output             1325 ml   Net            -1325 ml       PHYSICAL EXAM:  General: Alert, in no acute distress    HEENT: Anicteric sclerae. Lungs:            CTA Bilaterally. Heart:  Regular  rhythm,    Abdomen: Soft, Non distended, TTP in RUQ.  (+)Bowel sounds, no HSM  Extremities: No c/c/e  Neurologic:  CN 2-12 gi, Alert and oriented X 3. No acute neurological distress   Psych:   Good insight. Not anxious nor agitated. Lab Data Reviewed:   Recent Labs      18   0241  18   0459   WBC  10.0  14.5*   HGB  11.6*  12.3   HCT  33.5*  35.8*   PLT  259  251     Recent Labs      18   0241  18   0459   NA  135*  133*   K  3.4*  3.4*   CL  101  97   CO2  24  23   BUN  7  7   CREA  0.75  0.84   GLU  125*  147*   PHOS   --   2.8   CA  8.2*  8.2*     No results for input(s): SGOT, GPT, AP, TBIL, TP, ALB, GLOB, GGT, AML, LPSE in the last 72 hours.     No lab exists for component: Toby Lott    ________________________________________________________________________  Patient Active Problem List   Diagnosis Code    Colitis K52.9    HTN (hypertension) I10    Gout M10.9    Hyperglycemia R73.9    Transaminitis R74.0    Inguinal hernia K40.90    Hyponatremia E87.1    Hepatic steatosis K76.0    Diverticulitis K57.92         Assessment and Plan:  Acute Colitis: Improving    - Continue GI soft diet  - Recommend completing 7 days course of abx    Ok to discharge home from GI standpoint. I have given him our contact information to arrange for follow up as outpatient.        Signed By: Luis Agudelo NP     5/30/2018  10:54 AM

## 2018-05-30 NOTE — PROGRESS NOTES
Nutrition Assessment:    RECOMMENDATIONS/INTERVENTION(S):   1. Continue GI lite diet, encouraging PO intake. 2. Will revisit need for supplements if intake continues to be sub-optimal.    3. Will monitor labs/meds, wt, intake and need for diet education      ASSESSMENT:   5/30: Patient screened for length of stay x 5 days, admitted for colitis with new DM dx. PMHx of gout, and HTN. DTC following for DM. Patient reports good diet tolerance, which advanced yesterday, and fair appetite, skipped lunch today due to no appetite. Nursing also reporting fair appetite. PTA pt reports variable appetite and intake x1 week due to GI distress. Pt also reports slow wt loss of ~20lbs in a \"few months\" from 240lbs to 220lbs (10%). States he has had a lot going on with his son so eating isn't always a priority. Pt typically eats 2 meals per day at lunch and dinner,  a small breakfast such as a piece of toast. Discussed importance of intake and strategies to increase intake with decreased appetite. Pt was understanding answered all patient questions. Pt did not feel a need for ONS at this time. SUBJECTIVE/OBJECTIVE:   Diet Order: Other (comment) GI LIte  % Eaten:  No data found. Pertinent Medications: [x] Reviewed-Humalog, Protonix   Past Medical History:   Diagnosis Date    Gout     Hypertension        Chemistries:  Lab Results   Component Value Date/Time    Sodium 135 (L) 05/29/2018 02:41 AM    Potassium 3.4 (L) 05/29/2018 02:41 AM    Chloride 101 05/29/2018 02:41 AM    CO2 24 05/29/2018 02:41 AM    Anion gap 10 05/29/2018 02:41 AM    Glucose 125 (H) 05/29/2018 02:41 AM    BUN 7 05/29/2018 02:41 AM    Creatinine 0.75 05/29/2018 02:41 AM    BUN/Creatinine ratio 9 (L) 05/29/2018 02:41 AM    GFR est AA >60 05/29/2018 02:41 AM    GFR est non-AA >60 05/29/2018 02:41 AM    Calcium 8.2 (L) 05/29/2018 02:41 AM    AST (SGOT) 19 05/27/2018 05:18 AM    Alk.  phosphatase 62 05/27/2018 05:18 AM    Protein, total 7.2 05/27/2018 05:18 AM    Albumin 2.8 (L) 05/27/2018 05:18 AM    Globulin 4.4 (H) 05/27/2018 05:18 AM    A-G Ratio 0.6 (L) 05/27/2018 05:18 AM    ALT (SGPT) 39 05/27/2018 05:18 AM      Anthropometrics: Height: 6' (182.9 cm) Weight: 98.9 kg (218 lb 0.6 oz)    IBW (%IBW): 80.9 kg (178 lb 5.6 oz) (122.25 %) UBW (%UBW): 108.9 kg (240 lb) (90.85 %)    BMI: Body mass index is 29.57 kg/(m^2). This BMI is indicative of:   [] Underweight    [] Normal    [x] Overweight    []  Obesity    []  Extreme Obesity (BMI>40)  Estimated Nutrition Needs (Based on): 2492 Kcals/day (BMR 1917 x 1.3 AF) , 79 g (-99g (0.8-1.0g/kg actual BW)) Protein  Carbohydrate: At Least 130 g/day  Fluids: 2500 mL/day (1 ml/kcal)    Last BM: 5/28    [x]Active     []Hyperactive  []Hypoactive       [] Absent   BS  Skin:    [] Intact   [] Incision  [] Breakdown   [] DTI   [] Tears/Excoriation/Abrasion  [x]Edema-1+ LLE [] Other:    Wt Readings from Last 30 Encounters:   05/30/18 98.9 kg (218 lb 0.6 oz)   12/14/16 108.9 kg (240 lb)      NUTRITION DIAGNOSES:   Problem:  Inadequate oral intake     Etiology: related to Conditions related to dx: colitis, stress     Signs/Symptoms: as evidenced by patient reported decreased appetite and 20lbs wt loss in a \"few months\"      NUTRITION INTERVENTIONS:  Meals/Snacks: General/healthful diet, education on increasing intake with decreased appetite                  GOAL:   Patient to consume % of meals and and snacks with BG <180mg/dL in 3-5 days    Cultural, Mandaeism, or Ethnic Dietary Needs: None     EDUCATION & DISCHARGE NEEDS:    [] None Identified   [x] Identified and Education Provided/Documented, will monitor the need for DM diet ed   [] Identified and Pt declined/was not appropriate      [x] Interdisciplinary Care Plan Reviewed/Documented    [x] Discharge Needs: TBD   [] No Nutrition Related Discharge Needs    NUTRITION RISK:   Pt Is At Nutrition Risk  [x]     No Nutrition Risk Identified  []       PT SEEN FOR:    []  MD Consult: []Calorie Count      []Diabetic Diet Education        []Diet Education     []Electrolyte Management     []General Nutrition Management and Supplements     []Management of Tube Feeding     []TPN Recommendations    []  RN Referral:  []MST score >=2     []Enteral/Parenteral Nutrition PTA     []Pregnant: Gestational DM or Multigestation                 [] Pressure Ulcer    []  Low BMI      [x]  Length of Stay       [] Dysphagia Diet         [] Ventilator  []  Follow-up     Previous Recommendations:   [] Implemented          [] Not Implemented          [x] Not Applicable    Previous Goal:   [] Met              [] Progressing Towards Goal              [] Not Progressing Towards Goal   [x] Not Applicable            Sherrie Madrigal, [address] [place] [address]  Pager [phone]  Office [phone]

## 2018-05-31 LAB
BASOPHILS # BLD: 0.1 K/UL (ref 0–0.1)
BASOPHILS NFR BLD: 1 % (ref 0–1)
DIFFERENTIAL METHOD BLD: ABNORMAL
EOSINOPHIL # BLD: 0.1 K/UL (ref 0–0.4)
EOSINOPHIL NFR BLD: 1 % (ref 0–7)
ERYTHROCYTE [DISTWIDTH] IN BLOOD BY AUTOMATED COUNT: 11.9 % (ref 11.5–14.5)
GLUCOSE BLD STRIP.AUTO-MCNC: 143 MG/DL (ref 65–100)
GLUCOSE BLD STRIP.AUTO-MCNC: 183 MG/DL (ref 65–100)
GLUCOSE BLD STRIP.AUTO-MCNC: 189 MG/DL (ref 65–100)
GLUCOSE BLD STRIP.AUTO-MCNC: 212 MG/DL (ref 65–100)
HCT VFR BLD AUTO: 36.4 % (ref 36.6–50.3)
HGB BLD-MCNC: 12.6 G/DL (ref 12.1–17)
IMM GRANULOCYTES # BLD: 0.1 K/UL (ref 0–0.04)
IMM GRANULOCYTES NFR BLD AUTO: 1 % (ref 0–0.5)
LYMPHOCYTES # BLD: 1.1 K/UL (ref 0.8–3.5)
LYMPHOCYTES NFR BLD: 13 % (ref 12–49)
MCH RBC QN AUTO: 31.8 PG (ref 26–34)
MCHC RBC AUTO-ENTMCNC: 34.6 G/DL (ref 30–36.5)
MCV RBC AUTO: 91.9 FL (ref 80–99)
MONOCYTES # BLD: 0.6 K/UL (ref 0–1)
MONOCYTES NFR BLD: 7 % (ref 5–13)
NEUTS SEG # BLD: 6.5 K/UL (ref 1.8–8)
NEUTS SEG NFR BLD: 78 % (ref 32–75)
NRBC # BLD: 0 K/UL (ref 0–0.01)
NRBC BLD-RTO: 0 PER 100 WBC
PLATELET # BLD AUTO: 329 K/UL (ref 150–400)
PMV BLD AUTO: 8.7 FL (ref 8.9–12.9)
RBC # BLD AUTO: 3.96 M/UL (ref 4.1–5.7)
SERVICE CMNT-IMP: ABNORMAL
URATE SERPL-MCNC: 4.9 MG/DL (ref 3.5–7.2)
WBC # BLD AUTO: 8.3 K/UL (ref 4.1–11.1)

## 2018-05-31 PROCEDURE — 74011636637 HC RX REV CODE- 636/637: Performed by: INTERNAL MEDICINE

## 2018-05-31 PROCEDURE — 3E0U33Z INTRODUCTION OF ANTI-INFLAMMATORY INTO JOINTS, PERCUTANEOUS APPROACH: ICD-10-PCS | Performed by: ORTHOPAEDIC SURGERY

## 2018-05-31 PROCEDURE — 74011000250 HC RX REV CODE- 250: Performed by: PHYSICIAN ASSISTANT

## 2018-05-31 PROCEDURE — 0S9D3ZZ DRAINAGE OF LEFT KNEE JOINT, PERCUTANEOUS APPROACH: ICD-10-PCS | Performed by: ORTHOPAEDIC SURGERY

## 2018-05-31 PROCEDURE — 36415 COLL VENOUS BLD VENIPUNCTURE: CPT | Performed by: PHYSICIAN ASSISTANT

## 2018-05-31 PROCEDURE — 65270000029 HC RM PRIVATE

## 2018-05-31 PROCEDURE — 20610 DRAIN/INJ JOINT/BURSA W/O US: CPT

## 2018-05-31 PROCEDURE — 74011250637 HC RX REV CODE- 250/637: Performed by: INTERNAL MEDICINE

## 2018-05-31 PROCEDURE — 85025 COMPLETE CBC W/AUTO DIFF WBC: CPT | Performed by: PHYSICIAN ASSISTANT

## 2018-05-31 PROCEDURE — 74011000250 HC RX REV CODE- 250: Performed by: INTERNAL MEDICINE

## 2018-05-31 PROCEDURE — 74011250636 HC RX REV CODE- 250/636: Performed by: INTERNAL MEDICINE

## 2018-05-31 PROCEDURE — 3E0U3BZ INTRODUCTION OF ANESTHETIC AGENT INTO JOINTS, PERCUTANEOUS APPROACH: ICD-10-PCS | Performed by: ORTHOPAEDIC SURGERY

## 2018-05-31 PROCEDURE — 74011250637 HC RX REV CODE- 250/637: Performed by: PHYSICIAN ASSISTANT

## 2018-05-31 PROCEDURE — 74011250636 HC RX REV CODE- 250/636: Performed by: PHYSICIAN ASSISTANT

## 2018-05-31 PROCEDURE — 82962 GLUCOSE BLOOD TEST: CPT

## 2018-05-31 PROCEDURE — 84550 ASSAY OF BLOOD/URIC ACID: CPT | Performed by: PHYSICIAN ASSISTANT

## 2018-05-31 RX ORDER — BUPIVACAINE HYDROCHLORIDE 5 MG/ML
5 INJECTION, SOLUTION EPIDURAL; INTRACAUDAL ONCE
Status: COMPLETED | OUTPATIENT
Start: 2018-05-31 | End: 2018-05-31

## 2018-05-31 RX ORDER — TRIAMCINOLONE ACETONIDE 40 MG/ML
40 INJECTION, SUSPENSION INTRA-ARTICULAR; INTRAMUSCULAR ONCE
Status: COMPLETED | OUTPATIENT
Start: 2018-05-31 | End: 2018-05-31

## 2018-05-31 RX ORDER — LEVOFLOXACIN 500 MG/1
500 TABLET, FILM COATED ORAL EVERY 24 HOURS
Status: DISCONTINUED | OUTPATIENT
Start: 2018-05-31 | End: 2018-06-01 | Stop reason: HOSPADM

## 2018-05-31 RX ORDER — METRONIDAZOLE 250 MG/1
500 TABLET ORAL EVERY 12 HOURS
Status: DISCONTINUED | OUTPATIENT
Start: 2018-05-31 | End: 2018-05-31

## 2018-05-31 RX ORDER — LEVOFLOXACIN 500 MG/1
500 TABLET, FILM COATED ORAL EVERY 24 HOURS
Status: DISCONTINUED | OUTPATIENT
Start: 2018-05-31 | End: 2018-05-31

## 2018-05-31 RX ORDER — LIDOCAINE HYDROCHLORIDE 10 MG/ML
10 INJECTION, SOLUTION EPIDURAL; INFILTRATION; INTRACAUDAL; PERINEURAL ONCE
Status: ACTIVE | OUTPATIENT
Start: 2018-05-31 | End: 2018-05-31

## 2018-05-31 RX ORDER — KETOROLAC TROMETHAMINE 30 MG/ML
30 INJECTION, SOLUTION INTRAMUSCULAR; INTRAVENOUS ONCE
Status: COMPLETED | OUTPATIENT
Start: 2018-05-31 | End: 2018-05-31

## 2018-05-31 RX ORDER — METRONIDAZOLE 250 MG/1
500 TABLET ORAL EVERY 12 HOURS
Status: DISCONTINUED | OUTPATIENT
Start: 2018-05-31 | End: 2018-06-01 | Stop reason: HOSPADM

## 2018-05-31 RX ADMIN — COLCHICINE 0.6 MG: 0.6 CAPSULE ORAL at 10:38

## 2018-05-31 RX ADMIN — OXYCODONE HYDROCHLORIDE AND ACETAMINOPHEN 2 TABLET: 5; 325 TABLET ORAL at 22:55

## 2018-05-31 RX ADMIN — TRIAMCINOLONE ACETONIDE 40 MG: 40 INJECTION, SUSPENSION INTRA-ARTICULAR; INTRAMUSCULAR at 17:54

## 2018-05-31 RX ADMIN — BUPIVACAINE HYDROCHLORIDE 25 MG: 5 INJECTION, SOLUTION EPIDURAL; INTRACAUDAL at 17:54

## 2018-05-31 RX ADMIN — HYDROMORPHONE HYDROCHLORIDE 1 MG: 2 INJECTION INTRAMUSCULAR; INTRAVENOUS; SUBCUTANEOUS at 15:34

## 2018-05-31 RX ADMIN — METRONIDAZOLE 500 MG: 500 INJECTION, SOLUTION INTRAVENOUS at 09:49

## 2018-05-31 RX ADMIN — METRONIDAZOLE 500 MG: 250 TABLET ORAL at 20:09

## 2018-05-31 RX ADMIN — HEPARIN SODIUM 5000 UNITS: 5000 INJECTION, SOLUTION INTRAVENOUS; SUBCUTANEOUS at 22:56

## 2018-05-31 RX ADMIN — Medication 10 ML: at 05:18

## 2018-05-31 RX ADMIN — Medication 10 ML: at 22:55

## 2018-05-31 RX ADMIN — HYDROMORPHONE HYDROCHLORIDE 1 MG: 2 INJECTION INTRAMUSCULAR; INTRAVENOUS; SUBCUTANEOUS at 20:15

## 2018-05-31 RX ADMIN — HYDROMORPHONE HYDROCHLORIDE 1 MG: 2 INJECTION INTRAMUSCULAR; INTRAVENOUS; SUBCUTANEOUS at 09:48

## 2018-05-31 RX ADMIN — LEVOFLOXACIN 500 MG: 500 TABLET, FILM COATED ORAL at 20:09

## 2018-05-31 RX ADMIN — Medication 10 ML: at 14:00

## 2018-05-31 RX ADMIN — HYDROMORPHONE HYDROCHLORIDE 1 MG: 2 INJECTION INTRAMUSCULAR; INTRAVENOUS; SUBCUTANEOUS at 05:17

## 2018-05-31 RX ADMIN — HEPARIN SODIUM 5000 UNITS: 5000 INJECTION, SOLUTION INTRAVENOUS; SUBCUTANEOUS at 05:18

## 2018-05-31 RX ADMIN — INDOMETHACIN 25 MG: 25 CAPSULE ORAL at 09:48

## 2018-05-31 RX ADMIN — Medication 10 ML: at 20:15

## 2018-05-31 RX ADMIN — HEPARIN SODIUM 5000 UNITS: 5000 INJECTION, SOLUTION INTRAVENOUS; SUBCUTANEOUS at 15:34

## 2018-05-31 RX ADMIN — HYDROMORPHONE HYDROCHLORIDE 1 MG: 2 INJECTION INTRAMUSCULAR; INTRAVENOUS; SUBCUTANEOUS at 01:33

## 2018-05-31 RX ADMIN — PANTOPRAZOLE SODIUM 40 MG: 40 TABLET, DELAYED RELEASE ORAL at 09:48

## 2018-05-31 RX ADMIN — INSULIN LISPRO 2 UNITS: 100 INJECTION, SOLUTION INTRAVENOUS; SUBCUTANEOUS at 12:25

## 2018-05-31 RX ADMIN — KETOROLAC TROMETHAMINE 30 MG: 30 INJECTION, SOLUTION INTRAMUSCULAR at 17:53

## 2018-05-31 NOTE — PROGRESS NOTES
Bedside and Verbal shift change report given to Lyn Coles (oncoming nurse) by Carla Parada (offgoing nurse). Report included the following information SBAR, Kardex, Procedure Summary, Intake/Output, MAR, Accordion and Recent Results.

## 2018-05-31 NOTE — PROGRESS NOTES
[facility] [place] [facility]  [address] [facility], [address] [place] [address]  [phone]      Medical Progress Note      NAME: Rafael Garcia III   :  1975  MRM:  [id number]    Date/Time: 2018  11:45 AM       Assessment and Plan:   1.  Colitis (2018). Continue IV Levaquin and flagyl. Advanced diet. Pain management. Appreciated GI and gen surg input.      2.  LT side Inguinal hernia / Meckle's Diverticulum. General Surgery consult appreciated. Will follow-up outpatient for elective surgery.      3.  HTN (hypertension) (2018). Pt is not on BP meds. BP stable. Cont hydralazine PRN.     4.  Hyperglycemia (2018) c/w new onset diabetes mellitus type 2. Pt denies Hx of DM but A1c is 8.3. Will start SSI and FSBG. Will need Rx for meter, strips, lancets, and metformin on discharge. Addressed importance of PCP establishment with patient, has list at bedside.      5.  Transaminitis / Hepatic steatosis (2018). Continue to monitor LFTs.      6.  Hyponatremia (2018), mild. Likely due to volume depletion. Continue IVF       7.  Gout (2018). With acute flare of left knee. Started on indocin. S/P arthrocentesis and positive for crystals. Ortho following              Subjective:     Chief Complaint:  Follow up of pt who was admitted with colitis. worsening LT knee pain     ROS:  (bold if positive, if negative)     Abd Pain  Tolerating PT  Tolerating Diet        Objective:     Last 24hrs VS reviewed since prior progress note.  Most recent are:    Visit Vitals    /86 (BP 1 Location: Right arm, BP Patient Position: At rest)    Pulse 83    Temp 98.7 °F (37.1 °C)    Resp 18    Ht 6' (1.829 m)    Wt 98.9 kg (218 lb 0.6 oz)    SpO2 95%    BMI 29.57 kg/m2     SpO2 Readings from Last 6 Encounters:   18 95%   /16 94%          No intake or output data in the 24 hours ending 18 1115     Physical Exam:    Gen:  Well-developed, well-nourished, in no acute distress  HEENT:  Pink conjunctivae, PERRL, hearing intact to voice, moist mucous membranes  Neck:  Supple, without masses, thyroid non-tender  Resp:  No accessory muscle use, clear breath sounds without wheezes rales or rhonchi  Card:  No murmurs, normal S1, S2 without thrills, bruits or peripheral edema  Abd:  Soft, non-tender, non-distended, normoactive bowel sounds are present, no palpable organomegaly and no detectable hernias  Lymph:  No cervical or inguinal adenopathy  Musc:  No cyanosis or clubbing  Skin:  No rashes or ulcers, skin turgor is good  Neuro:  Cranial nerves are grossly intact, no focal motor weakness, follows commands appropriately  Psych:  Good insight, oriented to person, place and time, alert  __________________________________________________________________  Medications Reviewed: (see below)  Medications:     Current Facility-Administered Medications   Medication Dose Route Frequency    levoFLOXacin (LEVAQUIN) tablet 500 mg  500 mg Oral Q24H    metroNIDAZOLE (FLAGYL) tablet 500 mg  500 mg Oral Q12H    colchicine (MITIGARE) capsule 0.6 mg  0.6 mg Oral DAILY    HYDROmorphone (DILAUDID) injection 1 mg  1 mg IntraVENous Q3H PRN    oxyCODONE-acetaminophen (PERCOCET) 5-325 mg per tablet 2 Tab  2 Tab Oral Q6H PRN    pantoprazole (PROTONIX) tablet 40 mg  40 mg Oral DAILY    sodium chloride (NS) flush 5-10 mL  5-10 mL IntraVENous Q8H    sodium chloride (NS) flush 5-10 mL  5-10 mL IntraVENous PRN    heparin (porcine) injection 5,000 Units  5,000 Units SubCUTAneous Q8H    glucose chewable tablet 16 g  4 Tab Oral PRN    dextrose (D50W) injection syrg 12.5-25 g  12.5-25 g IntraVENous PRN    glucagon (GLUCAGEN) injection 1 mg  1 mg IntraMUSCular PRN    insulin lispro (HUMALOG) injection   SubCUTAneous AC&HS    hydrALAZINE (APRESOLINE) 20 mg/mL injection 10 mg  10 mg IntraVENous Q6H PRN        Lab Data Reviewed: (see below)  Lab Review:     Recent Labs      05/29/18   4221   WBC  10.0   HGB 11.6*   HCT  33.5*   PLT  259     Recent Labs      05/29/18   0241   NA  135*   K  3.4*   CL  101   CO2  24   GLU  125*   BUN  7   CREA  0.75   CA  8.2*     Lab Results   Component Value Date/Time    Glucose (POC) 143 (H) 05/31/2018 06:55 AM    Glucose (POC) 144 (H) 05/30/2018 09:04 PM    Glucose (POC) 189 (H) 05/30/2018 04:11 PM    Glucose (POC) 236 (H) 05/30/2018 11:31 AM    Glucose (POC) 122 (H) 05/30/2018 06:47 AM     No results for input(s): PH, PCO2, PO2, HCO3, FIO2 in the last 72 hours. No results for input(s): INR in the last 72 hours. No lab exists for component: INREXT, INREXT  All Micro Results     Procedure Component Value Units Date/Time    CULTURE, BODY FLUID Val Ron STAIN [[id number]] Collected:  05/27/18 1500    Order Status:  Completed Specimen:  Knee Fluid Updated:  05/29/18 0852     Special Requests: NO SPECIAL REQUESTS        GRAM STAIN 1+ WBCS SEEN         NO ORGANISMS SEEN        Culture result:         No growth thus far, holding 14 days. CULTURE, ANAEROBIC [[id number]] Collected:  05/27/18 1500    Order Status:  Completed Specimen:  Knee  Updated:  05/29/18 9835     Special Requests: NO SPECIAL REQUESTS        Culture result:         No growth thus far, holding 14 days. Tasha Ace [[id number]] Collected:  05/25/18 1745    Order Status:  Canceled Specimen:  Stool     OVA & Tyesha Bue [[id number]] Collected:  05/25/18 1745    Order Status:  Canceled Specimen:  Stool from Stool     C. DIFFICILE (DNA) [[id number]] Collected:  05/25/18 1745    Order Status:  Canceled     URINE CULTURE HOLD SAMPLE [[id number]] Collected:  05/24/18 2149    Order Status:  Completed Specimen:  Urine from Serum Updated:  05/24/18 2157     Urine culture hold         URINE ON HOLD IN MICROBIOLOGY DEPT FOR 3 DAYS. IF UNPRESERVED URINE IS SUBMITTED, IT CANNOT BE USED FOR ADDITIONAL TESTING AFTER 24 HRS, RECOLLECTION WILL BE REQUIRED. I have reviewed notes of prior 24hr.     Other pertinent lab: Total time spent with patient: Ondina 59 discussed with: Patient, Nursing Staff and >50% of time spent in counseling and coordination of care    Discussed:  Care Plan    Prophylaxis:  Lovenox    Disposition:  Home w/Family           ___________________________________________________    Attending Physician: Sabrina Romo MD

## 2018-05-31 NOTE — PROGRESS NOTES
[facility] Dosing Services: IV to PO Conversion    The pharmacist has determined that this patient meets P & T approved criteria for conversion from IV to oral therapy for the following medication: metronidazole and levofloxacin     The pharmacist has written the following order for the patient: metronidazole 500mg PO every 12 hours and levofloxacin 500mg PO every 24 hours. The pharmacist will continue to monitor the patient's status and advise the physician if conversion back to IV therapy is recommended.     Signed Pepe Serna Contact information:  [phone]

## 2018-05-31 NOTE — ROUTINE PROCESS
Bedside shift change report given to [address] [place] [address] (oncoming nurse) by Merari Toro (offgoing nurse). Report included the following information SBAR, Kardex, ED Summary, Procedure Summary, Intake/Output, MAR, Accordion, Recent Results and Med Rec Status.

## 2018-05-31 NOTE — PROGRESS NOTES
5/31/2018  12:16 PM  Case Management Note    Patient is still not able to be discharged. Will continue to follow.   Gely Middleton, [address]

## 2018-05-31 NOTE — PROGRESS NOTES
Orthopaedic Progress Note      May 31, 2018 5:13 PM     Patient: Enio Murry MRN: [id number]  SSN: [id number]    YOB: 1975  Age: 38 y.o. Sex: male      Admit date:  2018  Admitting Physician:  Irvin Gomez MD   Consulting Physician(s): Treatment Team: Attending Provider: Irvin Gomez MD; Consulting Provider: Marce Christine MD; Care Manager: Krysta Traore; Utilization Review: Marquita Matos RN; Consulting Provider: Jorje Dominguez NP; Consulting Provider: Dodie Hwang MD    SUBJECTIVE:     Enio Murry is a 38 y.o. male is resting in bed complaining of severe left knee pain. He has been icing and elevating without relief. He denies erythema about the knee, fever, or chills. His abdominal pain has resolved. OBJECTIVE:       Physical Exam:  General: Alert, cooperative, no distress. Respiratory: Respirations unlabored  Neurological:  Neurovascular exam within normal limits. Motor: + DF/PF. Musculoskeletal: Calves soft, supple, non-tender upon palpation. Large effusion left knee. He has pain to palpation of the MJL. There is no tenderness laterally. There is no erythema about the left knee. IMAGING:  Left knee with large suprapatella effusion. There is moderate medial and patellofemoral osteoarthritis.       Vital Signs:      Patient Vitals for the past 8 hrs:   BP Temp Pulse Resp SpO2   18 1634 137/83 98.2 °F (36.8 °C) 83 18 94 %   18 1540 164/86 98.2 °F (36.8 °C) 83 18 95 %   18 1118 177/83 98.3 °F (36.8 °C) 87 18 97 %                                          Temp (24hrs), Av.1 °F (36.7 °C), Min:97.4 °F (36.3 °C), Max:98.7 °F (37.1 °C)      Labs:        Recent Labs      18   1236   HCT  36.4*   HGB  12.6     Lab Results   Component Value Date/Time    Sodium 135 (L) 2018 02:41 AM    Potassium 3.4 (L) 2018 02:41 AM    Chloride 101 2018 02:41 AM    CO2 24 2018 02:41 AM    Glucose 125 (H) 05/29/2018 02:41 AM    BUN 7 05/29/2018 02:41 AM    Creatinine 0.75 05/29/2018 02:41 AM    Calcium 8.2 (L) 05/29/2018 02:41 AM       PT/OT:                Patient mobility                         ASSESSMENT / PLAN:   Active Problems:    Colitis (5/24/2018)      HTN (hypertension) (5/24/2018)      Gout (5/24/2018)      Hyperglycemia (5/24/2018)      Transaminitis (5/24/2018)      Inguinal hernia (5/24/2018)      Hyponatremia (5/24/2018)      Hepatic steatosis (5/24/2018)      Diverticulitis (5/24/2018)       A: 1. Left knee gout      2. Left knee osteoarthritis    P: 1. Patient has failed oral NSAIDs. We will proceed with aspiration and injection of his knee today. After site was prepped with alcohol and betadine I anesthestized the superolateral aspect of the knee with 1% plain lidocaine. I then aspirated 45 cc of blood tinged- gouty appearing fluid from the left knee followed by injection of 40mg of kenalog, 1% plain lidocaine, and .5% marcaine. Patient tolerated this well without adverse affect. 2. We will check progress in am.  If improved may DC home  3. Elevate and ice overnight.          Signed By:  Sandro Gonzalez, [address] [facility] 114

## 2018-06-01 VITALS
BODY MASS INDEX: 29.53 KG/M2 | DIASTOLIC BLOOD PRESSURE: 69 MMHG | HEART RATE: 94 BPM | HEIGHT: 72 IN | OXYGEN SATURATION: 96 % | TEMPERATURE: 98.3 F | WEIGHT: 218.03 LBS | RESPIRATION RATE: 18 BRPM | SYSTOLIC BLOOD PRESSURE: 167 MMHG

## 2018-06-01 DIAGNOSIS — E11.9 DIABETES MELLITUS WITHOUT COMPLICATION (HCC): Primary | ICD-10-CM

## 2018-06-01 PROBLEM — E87.1 HYPONATREMIA: Status: RESOLVED | Noted: 2018-05-24 | Resolved: 2018-06-01

## 2018-06-01 PROBLEM — R74.01 TRANSAMINITIS: Status: RESOLVED | Noted: 2018-05-24 | Resolved: 2018-06-01

## 2018-06-01 PROBLEM — K57.92 DIVERTICULITIS: Status: RESOLVED | Noted: 2018-05-24 | Resolved: 2018-06-01

## 2018-06-01 PROBLEM — R73.9 HYPERGLYCEMIA: Status: RESOLVED | Noted: 2018-05-24 | Resolved: 2018-06-01

## 2018-06-01 LAB
GLUCOSE BLD STRIP.AUTO-MCNC: 198 MG/DL (ref 65–100)
GLUCOSE BLD STRIP.AUTO-MCNC: 255 MG/DL (ref 65–100)
SERVICE CMNT-IMP: ABNORMAL
SERVICE CMNT-IMP: ABNORMAL

## 2018-06-01 PROCEDURE — 74011250637 HC RX REV CODE- 250/637: Performed by: INTERNAL MEDICINE

## 2018-06-01 PROCEDURE — 74011250636 HC RX REV CODE- 250/636: Performed by: INTERNAL MEDICINE

## 2018-06-01 PROCEDURE — 82962 GLUCOSE BLOOD TEST: CPT

## 2018-06-01 PROCEDURE — 74011636637 HC RX REV CODE- 636/637: Performed by: INTERNAL MEDICINE

## 2018-06-01 PROCEDURE — 97161 PT EVAL LOW COMPLEX 20 MIN: CPT

## 2018-06-01 RX ORDER — METFORMIN HYDROCHLORIDE 500 MG/1
500 TABLET ORAL 2 TIMES DAILY WITH MEALS
Qty: 60 TAB | Refills: 2 | Status: SHIPPED | OUTPATIENT
Start: 2018-06-01 | End: 2018-06-11 | Stop reason: ALTCHOICE

## 2018-06-01 RX ORDER — INDOMETHACIN 75 MG/1
75 CAPSULE, EXTENDED RELEASE ORAL DAILY
Qty: 30 CAP | Refills: 2 | Status: SHIPPED | OUTPATIENT
Start: 2018-06-01 | End: 2018-06-01

## 2018-06-01 RX ORDER — INDOMETHACIN 50 MG/1
50 CAPSULE ORAL 3 TIMES DAILY
Qty: 30 CAP | Refills: 0 | Status: SHIPPED | OUTPATIENT
Start: 2018-06-01 | End: 2018-06-11 | Stop reason: SDUPTHER

## 2018-06-01 RX ADMIN — INSULIN LISPRO 3 UNITS: 100 INJECTION, SOLUTION INTRAVENOUS; SUBCUTANEOUS at 11:52

## 2018-06-01 RX ADMIN — PANTOPRAZOLE SODIUM 40 MG: 40 TABLET, DELAYED RELEASE ORAL at 08:13

## 2018-06-01 RX ADMIN — METRONIDAZOLE 500 MG: 250 TABLET ORAL at 08:13

## 2018-06-01 RX ADMIN — COLCHICINE 0.6 MG: 0.6 CAPSULE ORAL at 08:13

## 2018-06-01 RX ADMIN — OXYCODONE HYDROCHLORIDE AND ACETAMINOPHEN 2 TABLET: 5; 325 TABLET ORAL at 12:42

## 2018-06-01 RX ADMIN — OXYCODONE HYDROCHLORIDE AND ACETAMINOPHEN 2 TABLET: 5; 325 TABLET ORAL at 06:06

## 2018-06-01 RX ADMIN — Medication 10 ML: at 05:59

## 2018-06-01 RX ADMIN — HEPARIN SODIUM 5000 UNITS: 5000 INJECTION, SOLUTION INTRAVENOUS; SUBCUTANEOUS at 05:59

## 2018-06-01 NOTE — PROGRESS NOTES
Patient given discharge instructions and prescriptions. Patient verbalizes understanding of follow up instructions and prescriptions. PIV removed. Patient driving self to home. Patient given time to ask questions, concerns addressed.

## 2018-06-01 NOTE — PROGRESS NOTES
Bedside and Verbal shift change report given to Aleyda Mo RN (oncoming nurse) by Kenyetta Lee RN (offgoing nurse). Report included the following information SBAR and Kardex.

## 2018-06-01 NOTE — PROGRESS NOTES
Problem: Falls - Risk of  Goal: *Absence of Falls  Document Rashad Fall Risk and appropriate interventions in the flowsheet.    Outcome: Progressing Towards Goal  Fall Risk Interventions:  Mobility Interventions: Patient to call before getting OOB         Medication Interventions: Teach patient to arise slowly    Elimination Interventions: Call light in reach, Urinal in reach    History of Falls Interventions: Evaluate medications/consider consulting pharmacy

## 2018-06-01 NOTE — PROGRESS NOTES
[facility] [address]  [address] [facility], [address] [place] [address]  [phone]      Medical Progress Note      NAME: Julieta Morris III   :  1975  MRM:  [id number]    Date/Time: 2018  11:45 AM       Assessment and Plan:   1.  Colitis (2018). Continue IV Levaquin and flagyl. Advanced diet. Pain management. Appreciated GI and gen surg input.      2.  LT side Inguinal hernia / Meckle's Diverticulum. General Surgery consult appreciated. Will follow-up outpatient for elective surgery.      3.  HTN (hypertension) (2018). Pt is not on BP meds. BP stable. Cont hydralazine PRN.     4.  Hyperglycemia (2018) c/w new onset diabetes mellitus type 2. Pt denies Hx of DM but A1c is 8.3. Will start SSI and FSBG. Will need Rx for meter, strips, lancets, and metformin on discharge. Addressed importance of PCP establishment with patient, has list at bedside.      5.  Transaminitis / Hepatic steatosis (2018). Continue to monitor LFTs.      6.  Hyponatremia (2018), mild. Likely due to volume depletion. Continue IVF       7.  Gout (2018). With acute flare of left knee. Started on indocin. S/P arthrocentesis and positive for crystals. Ortho following              Subjective:     Chief Complaint:  Follow up of pt who was admitted with colitis. worsening LT knee pain     ROS:  (bold if positive, if negative)     Abd Pain  Tolerating PT  Tolerating Diet        Objective:     Last 24hrs VS reviewed since prior progress note.  Most recent are:    Visit Vitals    /89 (BP 1 Location: Right arm, BP Patient Position: At rest)    Pulse 64    Temp 98.4 °F (36.9 °C)    Resp 18    Ht 6' (1.829 m)    Wt 98.9 kg (218 lb 0.6 oz)    SpO2 97%    BMI 29.57 kg/m2     SpO2 Readings from Last 6 Encounters:   18 97%   16 94%            Intake/Output Summary (Last 24 hours) at 18 1053  Last data filed at 18 0355   Gross per 24 hour   Intake              360 ml Output             1550 ml   Net            -1190 ml        Physical Exam:    Gen:  Well-developed, well-nourished, in no acute distress  HEENT:  Pink conjunctivae, PERRL, hearing intact to voice, moist mucous membranes  Neck:  Supple, without masses, thyroid non-tender  Resp:  No accessory muscle use, clear breath sounds without wheezes rales or rhonchi  Card:  No murmurs, normal S1, S2 without thrills, bruits or peripheral edema  Abd:  Soft, non-tender, non-distended, normoactive bowel sounds are present, no palpable organomegaly and no detectable hernias  Lymph:  No cervical or inguinal adenopathy  Musc:  No cyanosis or clubbing  Skin:  No rashes or ulcers, skin turgor is good  Neuro:  Cranial nerves are grossly intact, no focal motor weakness, follows commands appropriately  Psych:  Good insight, oriented to person, place and time, alert  __________________________________________________________________  Medications Reviewed: (see below)  Medications:     Current Facility-Administered Medications   Medication Dose Route Frequency    levoFLOXacin (LEVAQUIN) tablet 500 mg  500 mg Oral Q24H    metroNIDAZOLE (FLAGYL) tablet 500 mg  500 mg Oral Q12H    colchicine (MITIGARE) capsule 0.6 mg  0.6 mg Oral DAILY    HYDROmorphone (DILAUDID) injection 1 mg  1 mg IntraVENous Q3H PRN    oxyCODONE-acetaminophen (PERCOCET) 5-325 mg per tablet 2 Tab  2 Tab Oral Q6H PRN    pantoprazole (PROTONIX) tablet 40 mg  40 mg Oral DAILY    sodium chloride (NS) flush 5-10 mL  5-10 mL IntraVENous Q8H    sodium chloride (NS) flush 5-10 mL  5-10 mL IntraVENous PRN    heparin (porcine) injection 5,000 Units  5,000 Units SubCUTAneous Q8H    glucose chewable tablet 16 g  4 Tab Oral PRN    dextrose (D50W) injection syrg 12.5-25 g  12.5-25 g IntraVENous PRN    glucagon (GLUCAGEN) injection 1 mg  1 mg IntraMUSCular PRN    insulin lispro (HUMALOG) injection   SubCUTAneous AC&HS    hydrALAZINE (APRESOLINE) 20 mg/mL injection 10 mg  10 mg IntraVENous Q6H PRN        Lab Data Reviewed: (see below)  Lab Review:     Recent Labs      05/31/18   1236   WBC  8.3   HGB  12.6   HCT  36.4*   PLT  329     No results for input(s): NA, K, CL, CO2, GLU, BUN, CREA, CA, MG, PHOS, ALB, TBIL, TBILI, SGOT, ALT, INR in the last 72 hours. No lab exists for component: INREXT, INREXT  Lab Results   Component Value Date/Time    Glucose (POC) 198 (H) 06/01/2018 06:56 AM    Glucose (POC) 189 (H) 05/31/2018 09:05 PM    Glucose (POC) 183 (H) 05/31/2018 04:43 PM    Glucose (POC) 212 (H) 05/31/2018 11:17 AM    Glucose (POC) 143 (H) 05/31/2018 06:55 AM     No results for input(s): PH, PCO2, PO2, HCO3, FIO2 in the last 72 hours. No results for input(s): INR in the last 72 hours. No lab exists for component: INREXT, INREXT  All Micro Results     Procedure Component Value Units Date/Time    CULTURE, BODY FLUID [place] STAIN [[id number]] Collected:  05/27/18 1500    Order Status:  Completed Specimen:  Knee Fluid Updated:  05/29/18 0852     Special Requests: NO SPECIAL REQUESTS        GRAM STAIN 1+ WBCS SEEN         NO ORGANISMS SEEN        Culture result:         No growth thus far, holding 14 days. CULTURE, ANAEROBIC [[id number]] Collected:  05/27/18 1500    Order Status:  Completed Specimen:  Knee  Updated:  05/29/18 7991     Special Requests: NO SPECIAL REQUESTS        Culture result:         No growth thus far, holding 14 days. Beryle Livings [[id number]] Collected:  05/25/18 1745    Order Status:  Canceled Specimen:  Stool     OVA & Rella Sis [[id number]] Collected:  05/25/18 1745    Order Status:  Canceled Specimen:  Stool from Stool     C. DIFFICILE (DNA) [[id number]] Collected:  05/25/18 1745    Order Status:  Canceled     URINE CULTURE HOLD SAMPLE [[id number]] Collected:  05/24/18 2149    Order Status:  Completed Specimen:  Urine from Serum Updated:  05/24/18 2157     Urine culture hold         URINE ON HOLD IN MICROBIOLOGY DEPT FOR 3 DAYS.  IF UNPRESERVED URINE IS SUBMITTED, IT CANNOT BE USED FOR ADDITIONAL TESTING AFTER 24 HRS, RECOLLECTION WILL BE REQUIRED. I have reviewed notes of prior 24hr. Other pertinent lab:       Total time spent with patient: Ondina 59 discussed with: Patient, Nursing Staff and >50% of time spent in counseling and coordination of care    Discussed:  Care Plan    Prophylaxis:  Lovenox    Disposition:  Home w/Family           ___________________________________________________    Attending Physician: Finesse Durán MD

## 2018-06-01 NOTE — PROGRESS NOTES
Ortho Progress Note        S:  Sitting up in chair. L knee pain improved. O:  Lacks full extension L knee by 10 degrees. Pain improved with ROM. 80 degrees flexion. No erythema, no effusion. Motor/'sensory intact distally BLE's. A:  L knee gouty OA    P:  PT at home on his own continued ROM. Crutches, WBAT. Follow up with Rheumotology for gout. Dr. Lion Butler agrees with plan. Thank you for allowing us to take part in this patients care.     Janeth Morris PA-C  Orthopaedic Surgery PA

## 2018-06-01 NOTE — PROGRESS NOTES
[facility] [address]  [address], [place], [address] [facility]  [phone]      Medical Progress Note      NAME: Edil Walden III   :  1975  MRM:  [id number]    Date/Time: 2018  11:45 AM       Assessment and Plan:   1.  Colitis (2018). Continue IV Levaquin and flagyl. Advanced diet. Pain management. Appreciated GI and gen surg input.      2.  LT side Inguinal hernia / Meckle's Diverticulum. General Surgery consult appreciated. Will follow-up outpatient for elective surgery.      3.  HTN (hypertension) (2018). Pt is not on BP meds. BP stable. Cont hydralazine PRN.     4.  Hyperglycemia (2018) c/w new onset diabetes mellitus type 2. Pt denies Hx of DM but A1c is 8.3. Will start SSI and FSBG. Will need Rx for meter, strips, lancets, and metformin on discharge. Addressed importance of PCP establishment with patient, has list at bedside.      5.  Transaminitis / Hepatic steatosis (2018). Continue to monitor LFTs.      6.  Hyponatremia (2018), mild. Likely due to volume depletion. Continue IVF       7.  Gout (2018). With acute flare of left knee. Started on indocin. S/P arthrocentesis and positive for crystals. Ortho following              Subjective:     Chief Complaint:  Follow up of pt who was admitted with colitis. worsening LT knee pain     ROS:  (bold if positive, if negative)     Abd Pain  Tolerating PT  Tolerating Diet        Objective:     Last 24hrs VS reviewed since prior progress note.  Most recent are:    Visit Vitals    /69 (BP 1 Location: Left arm, BP Patient Position: At rest)    Pulse 94    Temp 98.3 °F (36.8 °C)    Resp 18    Ht 6' (1.829 m)    Wt 98.9 kg (218 lb 0.6 oz)    SpO2 96%    BMI 29.57 kg/m2     SpO2 Readings from Last 6 Encounters:   18 96%   16 94%            Intake/Output Summary (Last 24 hours) at 18 1308  Last data filed at 18 0355   Gross per 24 hour   Intake              360 ml Output             1550 ml   Net            -1190 ml        Physical Exam:    Gen:  Well-developed, well-nourished, in no acute distress  HEENT:  Pink conjunctivae, PERRL, hearing intact to voice, moist mucous membranes  Neck:  Supple, without masses, thyroid non-tender  Resp:  No accessory muscle use, clear breath sounds without wheezes rales or rhonchi  Card:  No murmurs, normal S1, S2 without thrills, bruits or peripheral edema  Abd:  Soft, non-tender, non-distended, normoactive bowel sounds are present, no palpable organomegaly and no detectable hernias  Lymph:  No cervical or inguinal adenopathy  Musc:  No cyanosis or clubbing  Skin:  No rashes or ulcers, skin turgor is good  Neuro:  Cranial nerves are grossly intact, no focal motor weakness, follows commands appropriately  Psych:  Good insight, oriented to person, place and time, alert  __________________________________________________________________  Medications Reviewed: (see below)  Medications:     Current Facility-Administered Medications   Medication Dose Route Frequency    levoFLOXacin (LEVAQUIN) tablet 500 mg  500 mg Oral Q24H    metroNIDAZOLE (FLAGYL) tablet 500 mg  500 mg Oral Q12H    colchicine (MITIGARE) capsule 0.6 mg  0.6 mg Oral DAILY    HYDROmorphone (DILAUDID) injection 1 mg  1 mg IntraVENous Q3H PRN    oxyCODONE-acetaminophen (PERCOCET) 5-325 mg per tablet 2 Tab  2 Tab Oral Q6H PRN    pantoprazole (PROTONIX) tablet 40 mg  40 mg Oral DAILY    sodium chloride (NS) flush 5-10 mL  5-10 mL IntraVENous Q8H    sodium chloride (NS) flush 5-10 mL  5-10 mL IntraVENous PRN    heparin (porcine) injection 5,000 Units  5,000 Units SubCUTAneous Q8H    glucose chewable tablet 16 g  4 Tab Oral PRN    dextrose (D50W) injection syrg 12.5-25 g  12.5-25 g IntraVENous PRN    glucagon (GLUCAGEN) injection 1 mg  1 mg IntraMUSCular PRN    insulin lispro (HUMALOG) injection   SubCUTAneous AC&HS    hydrALAZINE (APRESOLINE) 20 mg/mL injection 10 mg  10 mg IntraVENous Q6H PRN        Lab Data Reviewed: (see below)  Lab Review:     Recent Labs      05/31/18   1236   WBC  8.3   HGB  12.6   HCT  36.4*   PLT  329     No results for input(s): NA, K, CL, CO2, GLU, BUN, CREA, CA, MG, PHOS, ALB, TBIL, TBILI, SGOT, ALT, INR in the last 72 hours. No lab exists for component: INREXT, INREXT  Lab Results   Component Value Date/Time    Glucose (POC) 255 (H) 06/01/2018 11:41 AM    Glucose (POC) 198 (H) 06/01/2018 06:56 AM    Glucose (POC) 189 (H) 05/31/2018 09:05 PM    Glucose (POC) 183 (H) 05/31/2018 04:43 PM    Glucose (POC) 212 (H) 05/31/2018 11:17 AM     No results for input(s): PH, PCO2, PO2, HCO3, FIO2 in the last 72 hours. No results for input(s): INR in the last 72 hours. No lab exists for component: INREXT, INREXT  All Micro Results     Procedure Component Value Units Date/Time    CULTURE, BODY FLUID Daniel Icelandic STAIN [[id number]] Collected:  05/27/18 1500    Order Status:  Completed Specimen:  Knee Fluid Updated:  05/29/18 0852     Special Requests: NO SPECIAL REQUESTS        GRAM STAIN 1+ WBCS SEEN         NO ORGANISMS SEEN        Culture result:         No growth thus far, holding 14 days. CULTURE, ANAEROBIC [[id number]] Collected:  05/27/18 1500    Order Status:  Completed Specimen:  Knee  Updated:  05/29/18 6122     Special Requests: NO SPECIAL REQUESTS        Culture result:         No growth thus far, holding 14 days. Italia Dotytheron [[id number]] Collected:  05/25/18 1745    Order Status:  Canceled Specimen:  Stool     OVA & Emma Gillian [[id number]] Collected:  05/25/18 1745    Order Status:  Canceled Specimen:  Stool from Stool     C. DIFFICILE (DNA) [[id number]] Collected:  05/25/18 1745    Order Status:  Canceled     URINE CULTURE HOLD SAMPLE [[id number]] Collected:  05/24/18 2149    Order Status:  Completed Specimen:  Urine from Serum Updated:  05/24/18 2157     Urine culture hold         URINE ON HOLD IN MICROBIOLOGY DEPT FOR 3 DAYS.  IF UNPRESERVED URINE IS SUBMITTED, IT CANNOT BE USED FOR ADDITIONAL TESTING AFTER 24 HRS, RECOLLECTION WILL BE REQUIRED. I have reviewed notes of prior 24hr. Other pertinent lab:       Total time spent with patient: Ondina 59 discussed with: Patient, Nursing Staff and >50% of time spent in counseling and coordination of care    Discussed:  Care Plan    Prophylaxis:  Lovenox    Disposition:  Home w/Family           ___________________________________________________    Attending Physician: Lisy Tomas MD

## 2018-06-01 NOTE — DISCHARGE SUMMARY
Hospitalist Discharge Summary     Patient ID:    Soto Molina III  [id number]  38 y.o.  1975    Admit date: 5/24/2018    Discharge date and time: 6/1/2018    Admission Diagnoses: Colitis  Diverticulitis    Chronic Diagnoses:    Problem List as of 6/1/2018  Date Reviewed: 5/24/2018          Codes Class Noted - Resolved    DM (diabetes mellitus) ([facility] ICD-10-CM: E11.9  ICD-9-CM: 250.00  6/1/2018 - Present        Colitis ICD-10-CM: K52.9  ICD-9-CM: 558.9  5/24/2018 - Present        HTN (hypertension) ICD-10-CM: I10  ICD-9-CM: 401.9  5/24/2018 - Present        Gout ICD-10-CM: M10.9  ICD-9-CM: 274.9  5/24/2018 - Present        Inguinal hernia ICD-10-CM: K40.90  ICD-9-CM: 550.90  5/24/2018 - Present        Hepatic steatosis ICD-10-CM: K76.0  ICD-9-CM: 571.8  5/24/2018 - Present        RESOLVED: Hyperglycemia ICD-10-CM: R73.9  ICD-9-CM: 790.29  5/24/2018 - 6/1/2018        RESOLVED: Transaminitis ICD-10-CM: R74.0  ICD-9-CM: 790.4  5/24/2018 - 6/1/2018        RESOLVED: Hyponatremia ICD-10-CM: E87.1  ICD-9-CM: 276.1  5/24/2018 - 6/1/2018        RESOLVED: Diverticulitis ICD-10-CM: Y05.17  ICD-9-CM: 562.11  5/24/2018 - 6/1/2018              Discharge Medications:   Current Discharge Medication List      START taking these medications    Details   indomethacin SR (INDOCIN SR) 75 mg SR capsule Take 1 Cap by mouth daily for 90 days. Qty: 30 Cap, Refills: 2      metFORMIN (GLUCOPHAGE) 500 mg tablet Take 1 Tab by mouth two (2) times daily (with meals). Qty: 60 Tab, Refills: 2         CONTINUE these medications which have NOT CHANGED    Details   colchicine 0.6 mg tablet Take 0.6 mg by mouth daily. omeprazole (PRILOSEC) 20 mg capsule Take 20 mg by mouth daily. Follow up Care:    1. None in 1-2 weeks  2. rheumatology     Diet:  Diabetic Diet    Disposition:  Home. Advanced Directive:    Discharge Exam:  See today's note.     CONSULTATIONS: GI and Orthopedic Surgery    Significant Diagnostic Studies:   Recent Labs      05/31/18   1236   WBC  8.3   HGB  12.6   HCT  36.4*   PLT  329     Recent Labs      05/31/18   1236   URICA  4.9     No results for input(s): SGOT, GPT, ALT, AP, TBIL, TBILI, TP, ALB, GLOB, GGT, AML, LPSE in the last 72 hours. No lab exists for component: AMYP, HLPSE  No results for input(s): INR, PTP, APTT in the last 72 hours. No lab exists for component: INREXT   No results for input(s): FE, TIBC, PSAT, FERR in the last 72 hours. No results for input(s): PH, PCO2, PO2 in the last 72 hours. No results for input(s): CPK, CKMB in the last 72 hours. No lab exists for component: TROPONINI  Lab Results   Component Value Date/Time    Glucose (POC) 255 (H) 06/01/2018 11:41 AM    Glucose (POC) 198 (H) 06/01/2018 06:56 AM    Glucose (POC) 189 (H) 05/31/2018 09:05 PM    Glucose (POC) 183 (H) 05/31/2018 04:43 PM    Glucose (POC) 212 (H) 05/31/2018 11:17 AM             HOSPITAL COURSE & TREATMENT RENDERED:   1.  Colitis (5/24/2018). Finished Levaquin and flagyl. Advanced diet and tolerating well. Pain management. Appreciated GI and gen surg input. FU with GI as outpatient.       2.  LT side Inguinal hernia / Meckle's Diverticulum. General Surgery consult appreciated. Will follow-up outpatient for elective surgery.      3.  HTN (hypertension) (5/24/2018). Pt is not on BP meds. BP stable. Cont hydralazine PRN.     4.  Hyperglycemia (5/24/2018) c/w new onset diabetes mellitus type 2. Pt denies Hx of DM but A1c is 8.3. Will start SSI and FSBG. Will need Rx for meter, strips, lancets, and metformin on discharge. Addressed importance of PCP establishment with patient, has list at bedside.      5.  Transaminitis / Hepatic steatosis (5/24/2018). Continue to monitor LFTs.      6.  Hyponatremia (5/24/2018), mild. Likely due to volume depletion. Continue IVF       7.  Gout (5/24/2018). With acute flare of left knee. Started on indocin. S/P arthrocentesis and positive for crystals.  Ortho following     discharged in improved condition       Signed:  Calixto Kelly MD  6/1/2018  1:09 PM

## 2018-06-01 NOTE — DISCHARGE INSTRUCTIONS
ACUTE DIAGNOSES:  Colitis  Diverticulitis    CHRONIC MEDICAL DIAGNOSES:  Problem List as of 6/1/2018  Date Reviewed: 5/24/2018          Codes Class Noted - Resolved    DM (diabetes mellitus) ([facility] ICD-10-CM: E11.9  ICD-9-CM: 250.00  6/1/2018 - Present        Colitis ICD-10-CM: K52.9  ICD-9-CM: 558.9  5/24/2018 - Present        HTN (hypertension) ICD-10-CM: I10  ICD-9-CM: 401.9  5/24/2018 - Present        Gout ICD-10-CM: M10.9  ICD-9-CM: 274.9  5/24/2018 - Present        Inguinal hernia ICD-10-CM: K40.90  ICD-9-CM: 550.90  5/24/2018 - Present        Hepatic steatosis ICD-10-CM: K76.0  ICD-9-CM: 571.8  5/24/2018 - Present        RESOLVED: Hyperglycemia ICD-10-CM: R73.9  ICD-9-CM: 790.29  5/24/2018 - 6/1/2018        RESOLVED: Transaminitis ICD-10-CM: R74.0  ICD-9-CM: 790.4  5/24/2018 - 6/1/2018        RESOLVED: Hyponatremia ICD-10-CM: E87.1  ICD-9-CM: 276.1  5/24/2018 - 6/1/2018        RESOLVED: Diverticulitis ICD-10-CM: M49.99  ICD-9-CM: 562.11  5/24/2018 - 6/1/2018              DISCHARGE MEDICATIONS:          · It is important that you take the medication exactly as they are prescribed. · Keep your medication in the bottles provided by the pharmacist and keep a list of the medication names, dosages, and times to be taken in your wallet. · Do not take other medications without consulting your doctor. DIET:  Diabetic Diet    ACTIVITY: Activity as tolerated    ADDITIONAL INFORMATION: If you experience any of the following symptoms then please call your primary care physician or return to the emergency room if you cannot get hold of your doctor: Fever, chills, nausea, vomiting, diarrhea, change in mentation, falling, bleeding, shortness of breath. FOLLOW UP CARE:  primary care physician. you are to call and set up an appointment to see them in 2 weeks.     Follow-up with gastroeneterology in 2 week    Follow -up with rheumatology in 1-2 weeks       Information obtained by :  I understand that if any problems occur once I am at home I am to contact my physician. I understand and acknowledge receipt of the instructions indicated above.                                                                                                                                            Physician's or R.N.'s Signature                                                                  Date/Time                                                                                                                                              Patient or Representative Signature                                                          Date/Time

## 2018-06-01 NOTE — PROGRESS NOTES
physical Therapy EVALUATION/DISCHARGE  Patient: Rogers Garcia III (50 y.o. male)  Date: 6/1/2018  Primary Diagnosis: Colitis  Diverticulitis        Precautions:   WBAT, Fall  ASSESSMENT :  Based on the objective data described below, the patient presents on 5/24/18 due to colitis, during the course of his hospital course he developed and acute flare of Gout to the Left knee. Patient underwent a bedside aspiration to the Left knee by orthopedics yesterday. We were consulted for equipment and mobility assessment. Patient states he has used crutches in the past, has a set available for return to home. His bed and bath are on the 2nd floor of his home. Patient given initial cuing and sequencing instruction for axillary crutch usage, he was able to carry over with upright activity. Patient is overall MOD I for all upright mobility, no loss of balance or instability. Patient able to negotiate 14 steps with left hand rail and axillary crutch in the Right. Patient is cleared to discharge, use of axillary crutches with upright mobility. .    Skilled physical therapy is not indicated at this time. PLAN :  Discharge Recommendations: none  Further Equipment Recommendations for Discharge: axillary crutches- patient already owns     SUBJECTIVE:   Patient stated Shanda Pringle can get up.     OBJECTIVE DATA SUMMARY:   HISTORY:    Past Medical History:   Diagnosis Date    Gout     Hypertension      Past Surgical History:   Procedure Laterality Date    HX WISDOM TEETH EXTRACTION       Prior Level of Function/Home Situation: see below  Personal factors and/or comorbidities impacting plan of care:     Home Situation  Home Environment: Private residence  # Steps to Enter: 3  Rails to Enter: Yes  Hand Rails : Bilateral  Wheelchair Ramp: No  One/Two Story Residence: Two story  # of Interior Steps: 13  Interior Rails: Left  Lift Chair Available: No  Living Alone: No  Support Systems: Friends \ neighbors, Spouse/Significant Other/Partner  Patient Expects to be Discharged to[de-identified] Private residence  Current DME Used/Available at Home: None    EXAMINATION/PRESENTATION/DECISION MAKING:   Critical Behavior:              Hearing: Auditory  Auditory Impairment: None  Skin:  All exposed intact  Edema: none noted  Range Of Motion:  AROM: Within functional limits           PROM: Within functional limits           Strength:    Strength:  Within functional limits                    Tone & Sensation:   Tone: Normal              Sensation: Intact               Coordination:  Coordination: Within functional limits  Vision:      Functional Mobility:  Bed Mobility:  Rolling: Independent  Supine to Sit: Independent  Sit to Supine: Independent     Transfers:  Sit to Stand: Independent  Stand to Sit: Independent        Bed to Chair: Independent              Balance:   Sitting: Intact  Standing: Intact  Ambulation/Gait Training:  Distance (ft): 150 Feet (ft)  Assistive Device: Gait belt;Crutches  Ambulation - Level of Assistance: Modified independent     Gait Description (WDL): Exceptions to WDL                                              Stairs:  Number of Stairs Trained: 14  Stairs - Level of Assistance: Modified independent   Rail Use: Left      Therapeutic Exercises:       Functional Measure:  Tinetti test:    Sitting Balance: 1  Arises: 1  Attempts to Rise: 2  Immediate Standing Balance: 2  Standing Balance: 2  Nudged: 2  Eyes Closed: 1  Turn 360 Degrees - Continuous/Discontinuous: 0  Turn 360 Degrees - Steady/Unsteady: 1  Sitting Down: 2  Balance Score: 14  Indication of Gait: 1  R Step Length/Height: 1  L Step Length/Height: 1  R Foot Clearance: 1  L Foot Clearance: 1  Step Symmetry: 1  Step Continuity: 1  Path: 1  Trunk: 2  Walking Time: 1  Gait Score: 11  Total Score: 25       Tinetti Test and G-code impairment scale:  Percentage of Impairment CH    0%   CI    1-19% CJ    20-39% CK    40-59% CL    60-79% CM    80-99% CN     100%   Tinetti  Score 0-28 28 23-27 17-22 12-16 6-11 1-5 0       Tinetti Tool Score Risk of Falls  <19 = High Fall Risk  19-24 = Moderate Fall Risk  25-28 = Low Fall Risk  Tinetti ME. Performance-Oriented Assessment of Mobility Problems in Elderly Patients. [facility] 66; [id number]. (Scoring Description: PT Bulletin Feb. 10, 1993)    Older adults: Mary Kate Bello et al, 2009; n = 1000 [facility] elderly evaluated with ABC, JERRI, ADL, and IADL)  · Mean JERRI score for males aged 69-68 years = 26.21(3.40)  · Mean JERRI score for females age 69-68 years = 25.16(4.30)  · Mean JERRI score for males over 80 years = 23.29(6.02)  · Mean JERRI score for females over 80 years = 17.20(8.32)       G codes: In compliance with CMSs Claims Based Outcome Reporting, the following G-code set was chosen for this patient based on their primary functional limitation being treated: The outcome measure chosen to determine the severity of the functional limitation was the Tinetti with a score of 25/28 which was correlated with the impairment scale.     ? Mobility - Walking and Moving Around:     - CURRENT STATUS: CI - 1%-19% impaired, limited or restricted    - GOAL STATUS: CI - 1%-19% impaired, limited or restricted    - D/C STATUS:  CI - 1%-19% impaired, limited or restricted        Physical Therapy Evaluation Charge Determination   History Examination Presentation Decision-Making   HIGH Complexity :3+ comorbidities / personal factors will impact the outcome/ POC  LOW Complexity : 1-2 Standardized tests and measures addressing body structure, function, activity limitation and / or participation in recreation  LOW Complexity : Stable, uncomplicated  Other outcome measures Tinetti  LOW       Based on the above components, the patient evaluation is determined to be of the following complexity level: LOW     Pain:  Pain Scale 1: Numeric (0 - 10)  Pain Intensity 1: 5  Pain Location 1: Knee  Activity Tolerance:   Good- no complications with upright activity  Please refer to the flowsheet for vital signs taken during this treatment. After treatment:   [x]   Patient left in no apparent distress sitting up in chair  []   Patient left in no apparent distress in bed  [x]   Call bell left within reach  [x]   Nursing notified  []   Caregiver present  []   Bed alarm activated    COMMUNICATION/EDUCATION:   Communication/Collaboration:  [x]   Fall prevention education was provided and the patient/caregiver indicated understanding. [x]   Patient/family have participated as able and agree with findings and recommendations. []   Patient is unable to participate in plan of care at this time.   Findings and recommendations were discussed with: Registered Nurse    Thank you for this referral.  Lorne Yu, PT, DPT   Time Calculation: 11 mins

## 2018-06-10 LAB
BACTERIA SPEC CULT: NORMAL
BACTERIA SPEC CULT: NORMAL
GRAM STN SPEC: NORMAL
GRAM STN SPEC: NORMAL
SERVICE CMNT-IMP: NORMAL
SERVICE CMNT-IMP: NORMAL

## 2018-06-11 ENCOUNTER — OFFICE VISIT (OUTPATIENT)
Dept: INTERNAL MEDICINE CLINIC | Age: 43
End: 2018-06-11

## 2018-06-11 VITALS
TEMPERATURE: 99 F | HEIGHT: 72 IN | BODY MASS INDEX: 28.31 KG/M2 | SYSTOLIC BLOOD PRESSURE: 140 MMHG | HEART RATE: 106 BPM | OXYGEN SATURATION: 96 % | WEIGHT: 209 LBS | RESPIRATION RATE: 16 BRPM | DIASTOLIC BLOOD PRESSURE: 90 MMHG

## 2018-06-11 DIAGNOSIS — K40.90 INGUINAL HERNIA WITHOUT OBSTRUCTION OR GANGRENE, RECURRENCE NOT SPECIFIED, UNSPECIFIED LATERALITY: ICD-10-CM

## 2018-06-11 DIAGNOSIS — K52.9 COLITIS: ICD-10-CM

## 2018-06-11 DIAGNOSIS — Z76.89 ENCOUNTER TO ESTABLISH CARE WITH NEW DOCTOR: Primary | ICD-10-CM

## 2018-06-11 DIAGNOSIS — I10 ESSENTIAL HYPERTENSION: ICD-10-CM

## 2018-06-11 DIAGNOSIS — M10.9 GOUT OF LEFT KNEE, UNSPECIFIED CAUSE, UNSPECIFIED CHRONICITY: ICD-10-CM

## 2018-06-11 DIAGNOSIS — Z09 HOSPITAL DISCHARGE FOLLOW-UP: ICD-10-CM

## 2018-06-11 DIAGNOSIS — Q43.0 MECKEL DIVERTICULUM: ICD-10-CM

## 2018-06-11 DIAGNOSIS — E11.8 TYPE 2 DIABETES MELLITUS WITH COMPLICATION, WITHOUT LONG-TERM CURRENT USE OF INSULIN (HCC): ICD-10-CM

## 2018-06-11 RX ORDER — INSULIN PUMP SYRINGE, 3 ML
EACH MISCELLANEOUS
Qty: 1 KIT | Refills: 0 | Status: SHIPPED | OUTPATIENT
Start: 2018-06-11

## 2018-06-11 RX ORDER — INDOMETHACIN 50 MG/1
50 CAPSULE ORAL 3 TIMES DAILY
Qty: 60 CAP | Refills: 5 | Status: SHIPPED | OUTPATIENT
Start: 2018-06-11

## 2018-06-11 RX ORDER — METFORMIN HYDROCHLORIDE 500 MG/1
1000 TABLET, EXTENDED RELEASE ORAL
Qty: 60 TAB | Refills: 5 | Status: SHIPPED | OUTPATIENT
Start: 2018-06-11 | End: 2019-01-25 | Stop reason: SDUPTHER

## 2018-06-11 RX ORDER — LANCETS
EACH MISCELLANEOUS
Qty: 1 EACH | Refills: 11 | Status: SHIPPED | OUTPATIENT
Start: 2018-06-11

## 2018-06-11 NOTE — MR AVS SNAPSHOT
[facility] 
 
 
 [address] [place] [address] Road 
[phone] Patient: Francesca Estevez MRN: [id number] MDO:8/92/5516 Visit Information Date & Time Provider Department Dept. Phone Encounter #  
 6/11/2018  9:30 AM Benny Lai MD [facility] [address] [place] [address] [phone] [id number] Upcoming Health Maintenance Date Due  
 LIPID PANEL Q1 1973 FOOT EXAM Q1 9/19/1983 MICROALBUMIN Q1 9/19/1983 EYE EXAM RETINAL OR DILATED Q1 9/19/1983 Pneumococcal 19-64 Medium Risk (1 of 1 - PPSV23) 9/19/1992 DTaP/Tdap/Td series (1 - Tdap) 9/19/1994 Influenza Age 5 to Adult 8/1/2018 HEMOGLOBIN A1C Q6M 11/24/2018 Allergies as of 6/11/2018  Review Complete On: 0/63/1069 By: Delon Garcia Wears No Known Allergies Current Immunizations  Never Reviewed No immunizations on file. Not reviewed this visit You Were Diagnosed With   
  
 Codes Comments Encounter to establish care with new doctor    -  Primary ICD-10-CM: Z76.89 
ICD-9-CM: V65.8 Hospital discharge follow-up     ICD-10-CM: [facility] ICD-9-CM: V67.59 Type 2 diabetes mellitus with complication, without long-term current use of insulin (HCC)     ICD-10-CM: E11.8 ICD-9-CM: 250.90 Gout of left knee, unspecified cause, unspecified chronicity     ICD-10-CM: M10.9 ICD-9-CM: 274.9 Inguinal hernia without obstruction or gangrene, recurrence not specified, unspecified laterality     ICD-10-CM: K40.90 ICD-9-CM: 550.90 Colitis     ICD-10-CM: K52.9 ICD-9-CM: 558.9 Meckel diverticulum     ICD-10-CM: Q43.0 ICD-9-CM: 751.0 Vitals BP Pulse Temp Resp Height(growth percentile) Weight(growth percentile) 140/90 (!) 106 99 °F (37.2 °C) (Oral) 16 6' (1.829 m) 209 lb (94.8 kg) SpO2 BMI Smoking Status 96% 28.35 kg/m2 Never Smoker Vitals History BMI and BSA Data Body Mass Index Body Surface Area 28.35 kg/m 2 2.19 m 2 Preferred Pharmacy Pharmacy Name Phone [facility]/PHARMACY #[id number] Anam Gorman [facility] Your Updated Medication List  
  
   
This list is accurate as of 6/11/18  9:52 AM.  Always use your most recent med list.  
  
  
  
  
 Blood-Glucose Meter monitoring kit Check fasting BG daily. colchicine 0.6 mg tablet Take 0.6 mg by mouth daily. glucose blood VI test strips strip Commonly known as:  ASCENSIA AUTODISC VI, ONE TOUCH ULTRA TEST VI Check fasting BG daily. indomethacin 50 mg capsule Commonly known as:  INDOCIN Take 1 Cap by mouth three (3) times daily. Lancets Misc Check fasting BG daily. metFORMIN  mg tablet Commonly known as:  GLUCOPHAGE XR Take 2 Tabs by mouth daily (with dinner). omeprazole 20 mg capsule Commonly known as:  PRILOSEC Take 20 mg by mouth daily. Prescriptions Sent to Pharmacy Refills  
 metFORMIN ER (GLUCOPHAGE XR) 500 mg tablet 5 Sig: Take 2 Tabs by mouth daily (with dinner). Class: Normal  
 Pharmacy: [address] [place] [address] Ph #: [phone] Route: Oral  
 Blood-Glucose Meter monitoring kit 0 Sig: Check fasting BG daily. Class: Normal  
 Pharmacy: [address] [place] [address] Ph #: [phone] Lancets misc 11 Sig: Check fasting BG daily. Class: Normal  
 Pharmacy: [facility]/pharmacy #[id number] [address] Ph #: [phone]  
 glucose blood VI test strips (ASCENSIA AUTODISC VI, ONE TOUCH ULTRA TEST VI) strip 11 Sig: Check fasting BG daily. Class: Normal  
 Pharmacy: [facility]/pharmacy #[id number] Uazir Gutier[address] Ph #: [phone]  
 indomethacin (INDOCIN) 50 mg capsule 5 Sig: Take 1 Cap by mouth three (3) times daily.   
 Class: Normal  
 Pharmacy: [address] [place] [address] Ph #: [phone] Route: Oral  
  
We Performed the Following LIPID PANEL [00991 CPT(R)] METABOLIC PANEL, COMPREHENSIVE [29459 CPT(R)] MICROALBUMIN, UR, RAND W/ MICROALB/CREAT RATIO [id number] CPT(R)] URIC ACID [id number] CPT(R)] To-Do List   
 06/27/2018 1:00 PM  
  Appointment with [facility] at [facility] ([phone]) Patient Instructions Diabetes. org 
  
Learning About High Blood Pressure What is high blood pressure? Blood pressure is a measure of how hard the blood pushes against the walls of your arteries. It's normal for blood pressure to go up and down throughout the day, but if it stays up, you have high blood pressure. Another name for high blood pressure is hypertension. Two numbers tell you your blood pressure. The first number is the systolic pressure. It shows how hard the blood pushes when your heart is pumping. The second number is the diastolic pressure. It shows how hard the blood pushes between heartbeats, when your heart is relaxed and filling with blood. A blood pressure of less than 120/80 (say \"120 over 80\") is ideal for an adult. High blood pressure is 140/90 or higher. You have high blood pressure if your top number is 140 or higher or your bottom number is 90 or higher, or both. Many people fall into the category in between, called prehypertension. People with prehypertension need to make lifestyle changes to bring their blood pressure down and help prevent or delay high blood pressure. What happens when you have high blood pressure? · Blood flows through your arteries with too much force. Over time, this damages the walls of your arteries. But you can't feel it. High blood pressure usually doesn't cause symptoms. · Fat and calcium start to build up in your arteries. This buildup is called plaque. Plaque makes your arteries narrower and stiffer. Blood can't flow through them as easily. · This lack of good blood flow starts to damage some of the organs in your body. This can lead to problems such as coronary artery disease and heart attack, heart failure, stroke, kidney failure, and eye damage. How can you prevent high blood pressure? · Stay at a healthy weight. · Try to limit how much sodium you eat to less than 2,300 milligrams (mg) a day. If you limit your sodium to 1,500 mg a day, you can lower your blood pressure even more. ¨ Buy foods that are labeled \"unsalted,\" \"sodium-free,\" or \"low-sodium. \" Foods labeled \"reduced-sodium\" and \"light sodium\" may still have too much sodium. ¨ Flavor your food with garlic, lemon juice, onion, vinegar, herbs, and spices instead of salt. Do not use soy sauce, steak sauce, onion salt, garlic salt, mustard, or ketchup on your food. ¨ Use less salt (or none) when recipes call for it. You can often use half the salt a recipe calls for without losing flavor. · Be physically active. Get at least 30 minutes of exercise on most days of the week. Walking is a good choice. You also may want to do other activities, such as running, swimming, cycling, or playing tennis or team sports. · Limit alcohol to 2 drinks a day for men and 1 drink a day for women. · Eat plenty of fruits, vegetables, and low-fat dairy products. Eat less saturated and total fats. How is high blood pressure treated? · Your doctor will suggest making lifestyle changes. For example, your doctor may ask you to eat healthy foods, quit smoking, lose extra weight, and be more active. · If lifestyle changes don't help enough or your blood pressure is very high, you will have to take medicine every day. Follow-up care is a key part of your treatment and safety. Be sure to make and go to all appointments, and call your doctor if you are having problems. It's also a good idea to know your test results and keep a list of the medicines you take. Where can you learn more? Go to http://mark-yanna.info/. Enter P501 in the search box to learn more about \"Learning About High Blood Pressure. \" Current as of: September 21, 2016 Content Version: 11.4 © 4409-4967 Loftware. Care instructions adapted under license by [employer] (which disclaims liability or warranty for this information). If you have questions about a medical condition or this instruction, always ask your healthcare professional. [address] any warranty or liability for your use of this information. Learning About Meal Planning for Diabetes Why plan your meals? Meal planning can be a key part of managing diabetes. Planning meals and snacks with the right balance of carbohydrate, protein, and fat can help you keep your blood sugar at the target level you set with your doctor. You don't have to eat special foods. You can eat what your family eats, including sweets once in a while. But you do have to pay attention to how often you eat and how much you eat of certain foods. You may want to work with a dietitian or a certified diabetes educator. He or she can give you tips and meal ideas and can answer your questions about meal planning. This health professional can also help you reach a healthy weight if that is one of your goals. What plan is right for you? Your dietitian or diabetes educator may suggest that you start with the plate format or carbohydrate counting. The plate format The plate format is a simple way to help you manage how you eat. You plan meals by learning how much space each food should take on a plate. Using the plate format helps you spread carbohydrate throughout the day. It can make it easier to keep your blood sugar level within your target range. It also helps you see if you're eating healthy portion sizes.  
To use the plate format, you put non-starchy vegetables on half your plate. Add meat or meat substitutes on one-quarter of the plate. Put a grain or starchy vegetable (such as brown rice or a potato) on the final quarter of the plate. You can add a small piece of fruit and some low-fat or fat-free milk or yogurt, depending on your carbohydrate goal for each meal. 
Here are some tips for using the plate format: · Make sure that you are not using an oversized plate. A 9-inch plate is best. Many restaurants use larger plates. · Get used to using the plate format at home. Then you can use it when you eat out. · Write down your questions about using the plate format. Talk to your doctor, a dietitian, or a diabetes educator about your concerns. Carbohydrate counting With carbohydrate counting, you plan meals based on the amount of carbohydrate in each food. Carbohydrate raises blood sugar higher and more quickly than any other nutrient. It is found in desserts, breads and cereals, and fruit. It's also found in starchy vegetables such as potatoes and corn, grains such as rice and pasta, and milk and yogurt. Spreading carbohydrate throughout the day helps keep your blood sugar levels within your target range. Your daily amount depends on several things, including your weight, how active you are, which diabetes medicines you take, and what your goals are for your blood sugar levels. A registered dietitian or diabetes educator can help you plan how much carbohydrate to include in each meal and snack. A guideline for your daily amount of carbohydrate is: · 45 to 60 grams at each meal. That's about the same as 3 to 4 carbohydrate servings. · 15 to 20 grams at each snack. That's about the same as 1 carbohydrate serving. The Nutrition Facts label on packaged foods tells you how much carbohydrate is in a serving of the food. First, look at the serving size on the food label. Is that the amount you eat in a serving?  All of the nutrition information on a food label is based on that serving size. So if you eat more or less than that, you'll need to adjust the other numbers. Total carbohydrate is the next thing you need to look for on the label. If you count carbohydrate servings, one serving of carbohydrate is 15 grams. For foods that don't come with labels, such as fresh fruits and vegetables, you'll need a guide that lists carbohydrate in these foods. Ask your doctor, dietitian, or diabetes educator about books or other nutrition guides you can use. If you take insulin, you need to know how many grams of carbohydrate are in a meal. This lets you know how much rapid-acting insulin to take before you eat. If you use an insulin pump, you get a constant rate of insulin during the day. So the pump must be programmed at meals to give you extra insulin to cover the rise in blood sugar after meals. When you know how much carbohydrate you will eat, you can take the right amount of insulin. Or, if you always use the same amount of insulin, you need to make sure that you eat the same amount of carbohydrate at meals. If you need more help to understand carbohydrate counting and food labels, ask your doctor, dietitian, or diabetes educator. How do you get started with meal planning? Here are some tips to get started: 
· Plan your meals a week at a time. Don't forget to include snacks too. · Use cookbooks or online recipes to plan several main meals. Plan some quick meals for busy nights. You also can double some recipes that freeze well. Then you can save half for other busy nights when you don't have time to cook. · Make sure you have the ingredients you need for your recipes. If you're running low on basic items, put these items on your shopping list too. · List foods that you use to make breakfasts, lunches, and snacks. List plenty of fruits and vegetables. · Post this list on the refrigerator.  Add to it as you think of more things you need. · Take the list to the store to do your weekly shopping. Follow-up care is a key part of your treatment and safety. Be sure to make and go to all appointments, and call your doctor if you are having problems. It's also a good idea to know your test results and keep a list of the medicines you take. Where can you learn more? Go to http://mark-yanna.info/. Joy Bourgeois in the search box to learn more about \"Learning About Meal Planning for Diabetes. \" Current as of: 2017 Content Version: 11.4 © 7836-8867 StashMetrics. Care instructions adapted under license by Tyk[employer] (which disclaims liability or warranty for this information). If you have questions about a medical condition or this instruction, always ask your healthcare professional. [address] any warranty or liability for your use of this information. Home Blood Glucose Test: About This Test 
What is it? A home blood glucose test measures the amount of a type of sugar, called glucose, in your blood. Why is this test done? People who have diabetes need to check the amount of glucose in their blood. A home blood glucose test is an easy way to test your blood at home or when you are away from home. The results help you know when to take action to keep your blood glucose levels in a target range. How can you prepare for the test? 
· Check the expiration date on the bottle of testing strips. Do not use test strips that have . · Match the code number on the testing strips bottle with the number on the meter. If the numbers do not match, follow the directions with the meter for changing the code number. What happens before the test? 
The supplies you will need for testing blood glucose include: · A blood glucose meter. · Testing strips. These are made to be used with a specific model of meter. · Sugar control solutions. Some meters require a specific solution. Many new meters are made to operate without a control solution. · Short needles called lancets for pricking your skin. · A pen-sized coulter for the lancet (lancet device), which positions the lancet and controls how deeply it goes into your skin. · Clean cotton balls. These are used to stop the bleeding from the testing site. What happens during the test? 
A home blood glucose test involves pricking your finger, palm, or forearm with a lancet to collect a drop of blood. The blood drop is placed on a test strip, which you insert into the blood glucose meter. The instructions for testing are slightly different for each blood glucose meter model. Follow the instructions that came with your meter. · Wash your hands with warm, soapy water. Dry them well with a clean towel. You may also use an alcohol wipe to clean your finger or other site, but make sure your hands are dry before the test. 
· Insert a clean lancet into the lancet device. · Remove a test strip from the test strip bottle. Replace the lid immediately to keep moisture away from the other strips. · Follow the instructions that came with your meter to get it ready. · Use the lancet device to stick the side of your fingertip with the lancet. Do not stick the tip of your finger. Some blood sugar meters use lancet devices that take the blood sample from other sites, such as the palm of the hand or the forearm. But the finger is usually the most accurate place to test blood sugar. · Put a drop of blood on the correct spot on the test strip. · Apply pressure with a clean cotton ball to stop the bleeding. · Follow the directions that came with the meter to get the results. · Write down the results and the time that you tested your blood. Some meters will store the results for you.  
What else should you know about the test? 
 The American Diabetes Association (ADA) recommends that you stay within the following blood glucose level ranges. But depending on your health, you and your doctor may set a different range for you. For nonpregnant adults with diabetes: 
· 80 milligrams per deciliter (mg/dL) to 130 mg/dL before a meal 
· Less than 180 mg/dL 1 to 2 hours after a meal 
For women who have diabetes related to pregnancy (gestational diabetes): · 95 mg/dL or less before breakfast 
· 120 to 140 mg/dL (or lower) 1 to 2 hours after a meal 
How long does the test take? · The blood glucose meter will show the results of the test in a minute or less. Where can you learn more? Go to http://mark-yanna.info/. Enter F877 in the search box to learn more about \"Home Blood Glucose Test: About This Test.\" Current as of: March 13, 2017 Content Version: 11.4 © 1379-3863 Colto. Care instructions adapted under license by [employer] (which disclaims liability or warranty for this information). If you have questions about a medical condition or this instruction, always ask your healthcare professional. [place] [address] any warranty or liability for your use of this information. Purine-Restricted Diet: Care Instructions Your Care Instructions Purines are substances that are found in some foods. Your body turns purines into uric acid. High levels of uric acid can cause gout, which is a form of arthritis that causes pain and inflammation in joints. You may be able to help control the amount of uric acid in your body by limiting high-purine foods in your diet. Follow-up care is a key part of your treatment and safety. Be sure to make and go to all appointments, and call your doctor if you are having problems. It's also a good idea to know your test results and keep a list of the medicines you take. How can you care for yourself at home? · Plan your meals and snacks around foods that are low in purines and are safe for you to eat. These foods include: ¨ Green vegetables and tomatoes. ¨ Fruits. ¨ Whole-grain breads, rice, and cereals. ¨ Eggs, peanut butter, and nuts. ¨ Low-fat milk, cheese, and other milk products. ¨ Popcorn. ¨ Gelatin desserts, chocolate, cocoa, and cakes and sweets, in small amounts. · You can eat certain foods that are medium-high in purines, but eat them only once in a while. These foods include: ¨ Legumes, such as dried beans and dried peas. You can have 1 cup cooked legumes each day. ¨ Asparagus, cauliflower, spinach, mushrooms, and green peas. ¨ Fish and seafood (other than very high-purine seafood). ¨ Oatmeal, wheat bran, and wheat germ. · Limit very high-purine foods, including: ¨ Organ meats, such as liver, kidneys, sweetbreads, and brains. ¨ Meats, including rodriguez, beef, pork, and lamb. ¨ Game meats and any other meats in large amounts. ¨ Anchovies, sardines, herring, mackerel, and scallops. ¨ Gravy. ¨ Beer. Where can you learn more? Go to http://mark-yanna.info/. Enter F448 in the search box to learn more about \"Purine-Restricted Diet: Care Instructions. \" Current as of: May 12, 2017 Content Version: 11.4 © 7649-7522 LogicTree. Care instructions adapted under license by [employer] (which disclaims liability or warranty for this information). If you have questions about a medical condition or this instruction, always ask your healthcare professional. [address] any warranty or liability for your use of this information. Introducing [facility]! Dear Della Lay: Thank you for requesting a Perfect [employer] account. Our records indicate that you already have an active [employer] account. You can access your account anytime at [email or web address] Did you know that you can access your hospital and ER discharge instructions at any time in [facility]? You can also review all of your test results from your hospital stay or ER visit. Additional Information If you have questions, please visit the Frequently Asked Questions section of the [employer] website at [email or web address] Remember, [employer] is NOT to be used for urgent needs. For medical emergencies, dial 911. Now available from your iPhone and Android! Please provide this summary of care documentation to your next provider. Your primary care clinician is listed as Aravind Henson. If you have any questions after today's visit, please call [phone].

## 2018-06-11 NOTE — PROGRESS NOTES
Elyssa Shoemaker is a 38 y.o. male who presents today for [facility] Follow Up  . He has a history of   Patient Active Problem List   Diagnosis Code    Colitis K52.9    HTN (hypertension) I10    Gout M10.9    Inguinal hernia K40.90    Hepatic steatosis K76.0    DM (diabetes mellitus) ([facility] [address] E11.9    Meckel diverticulum Q43.0   . Today patient is here to establish care. Previous PCP in [place]. he is switching because hospitalization. Records are not available for me to review. he does have other concerns. Hospitalization: from  to  due to colitis. Treated with Abx and improved. Noted to also have an inguinal hernia and Meckel's diverticulum. Noted to have a Gouty flare during his hospitalization. Drainage of L knee was consistent with gout. Colitis: s/p abx, doing better. Back to a normal diet. No more abd pain. Stool getting back to normal.     DM: noted during his hospitalization. A1C of 8.3. Notes show A1C of 8.4 in 2016. Notes having never been treated for this. Now on metformin. Some mild soft stools. Not checking BG     Gout: history of gout. First flare in his 17's. Notes has a tophie in R elbow. Has not changed in size. Last flare before this one was late last year. Still having some mild L knee pain. Meckel's diverticulum and Inguinal Hernia: noted on imaging. Asymptomatic. Seen by surgery who suggests outpt evaluation. Elevated LFT's: resolved during hospitalization. HTN: noted some elevations in the hospital.. Repeat better. Social: Moved here from [place], in . Works for a [employer] for [employer]. [place]    Works in advertisement. No tobacco, Social EtOH. Lives with wife and 3 boys. Family: Father:  from accident younger in life. .     Mother: Healthy. GrandParents: Gout, DM.     ROS  Review of Systems   Constitutional: Negative for chills, fever, malaise/fatigue and weight loss.    HENT: Negative for ear discharge, ear pain, hearing loss, nosebleeds and tinnitus. Eyes: Negative for blurred vision, double vision, photophobia, pain and discharge. Respiratory: Negative for cough, hemoptysis, sputum production and shortness of breath. Cardiovascular: Negative for chest pain, palpitations, orthopnea and leg swelling. Gastrointestinal: Positive for abdominal pain, diarrhea and heartburn. Negative for constipation, nausea and vomiting. Genitourinary: Negative for dysuria, frequency, hematuria and urgency. Musculoskeletal: Positive for joint pain. Negative for back pain, myalgias and neck pain. Skin: Negative for itching and rash. Neurological: Negative. Endo/Heme/Allergies: Does not bruise/bleed easily. Psychiatric/Behavioral: Negative for depression. The patient is not nervous/anxious. Visit Vitals    /90    Pulse (!) 106    Temp 99 °F (37.2 °C) (Oral)    Resp 16    Ht 6' (1.829 m)    Wt 209 lb (94.8 kg)    SpO2 96%    BMI 28.35 kg/m2       Physical Exam   Constitutional: He is oriented to person, place, and time and well-developed, well-nourished, and in no distress. No distress. HENT:   Head: Normocephalic and atraumatic. Neck: Normal range of motion. Neck supple. No thyromegaly present. Cardiovascular: Normal rate and regular rhythm. No murmur heard. Pulmonary/Chest: Effort normal and breath sounds normal. No respiratory distress. He has no wheezes. Abdominal: Soft. He exhibits no mass. There is no guarding. Increased BS generally. Mild tenderness to RLQ. Musculoskeletal: Normal range of motion. He exhibits no edema. Tophi to R elbow. Neurological: He is alert and oriented to person, place, and time. Skin: Skin is warm and dry. He is not diaphoretic. Foot exam  - skin intact, mild dryness w no fissures, no rashes, no significant ulcers or callous formation.  Sensation intact by microfilament or light touch     Psychiatric: Affect and judgment normal.       Current Outpatient Prescriptions   Medication Sig    metFORMIN ER (GLUCOPHAGE XR) 500 mg tablet Take 2 Tabs by mouth daily (with dinner).  Blood-Glucose Meter monitoring kit Check fasting BG daily.  Lancets misc Check fasting BG daily.  glucose blood VI test strips (ASCENSIA AUTODISC VI, ONE TOUCH ULTRA TEST VI) strip Check fasting BG daily.  indomethacin (INDOCIN) 50 mg capsule Take 1 Cap by mouth three (3) times daily.  colchicine 0.6 mg tablet Take 0.6 mg by mouth daily.  omeprazole (PRILOSEC) 20 mg capsule Take 20 mg by mouth daily. No current facility-administered medications for this visit. Past Medical History:   Diagnosis Date    Arthritis     Diabetes ([address]     Gout     Gout     Hiatal hernia     Hypertension       Past Surgical History:   Procedure Laterality Date    HX WISDOM TEETH EXTRACTION        Social History   Substance Use Topics    Smoking status: Never Smoker    Smokeless tobacco: Never Used    Alcohol use 1.2 oz/week     2 Glasses of wine per week      Family History   Problem Relation Age of Onset    Hypertension Mother     Hypertension Father     Diabetes Maternal Aunt     Diabetes Maternal Uncle     Diabetes Maternal Grandmother     Diabetes Maternal Grandfather     Diabetes Paternal Grandmother     Diabetes Paternal Grandfather         No Known Allergies     Assessment/Plan  Diagnoses and all orders for this visit:    1. Encounter to establish care with new doctor - No significant previous medical records besides urgent visit. 2. Hospital discharge follow-up - have reviewed hospitalization and reviewed with pt.     3. Type 2 diabetes mellitus with complication, without long-term current use of insulin (HCC) - discussed goal BG fasting and A1C. Referred to Diabetes. org for personal research and reading. Suggest getting together questions and seeing me at f/u. Change to XR (may be reason for continued loose BM).  Seeing diabetes education.   -     LIPID PANEL  -     MICROALBUMIN, UR, RAND W/ MICROALB/CREAT RATIO  -     METABOLIC PANEL, COMPREHENSIVE  -     metFORMIN ER (GLUCOPHAGE XR) 500 mg tablet; Take 2 Tabs by mouth daily (with dinner). -     Blood-Glucose Meter monitoring kit; Check fasting BG daily.  -     Lancets misc; Check fasting BG daily.  -     glucose blood VI test strips (ASCENSIA AUTODISC VI, ONE TOUCH ULTRA TEST VI) strip; Check fasting BG daily. 4. Gout of left knee, unspecified cause, unspecified chronicity - recurrent and pretty severe disease. Tophi to elbow. Repeat UA once out of flare. Continue daily colchicine.   -     URIC ACID  -     indomethacin (INDOCIN) 50 mg capsule; Take 1 Cap by mouth three (3) times daily. 5. Inguinal hernia without obstruction or gangrene, recurrence not specified, unspecified laterality - will see surg. 6. Colitis - s/p treatment. Will be scheduling f/u with GI.   -     METABOLIC PANEL, COMPREHENSIVE    7. Meckel diverticulum    8. Essential hypertension - borderline. Monitor at home .          Follow-up Disposition: Not on File    Herbert Butler MD  6/11/2018

## 2018-06-11 NOTE — PROGRESS NOTES
1. Have you been to the ER, urgent care clinic since your last visit? Hospitalized since your last visit? Yes,  Kirill Shen    2. Have you seen or consulted any other health care providers outside of the [address] [place] [address] since your last visit? Include any pap smears or colon screening. Warren mohan he hasn't seen a Dr in about 4+ years.

## 2018-06-11 NOTE — PATIENT INSTRUCTIONS
Diabetes. org     Learning About High Blood Pressure  What is high blood pressure? Blood pressure is a measure of how hard the blood pushes against the walls of your arteries. It's normal for blood pressure to go up and down throughout the day, but if it stays up, you have high blood pressure. Another name for high blood pressure is hypertension. Two numbers tell you your blood pressure. The first number is the systolic pressure. It shows how hard the blood pushes when your heart is pumping. The second number is the diastolic pressure. It shows how hard the blood pushes between heartbeats, when your heart is relaxed and filling with blood. A blood pressure of less than 120/80 (say \"120 over 80\") is ideal for an adult. High blood pressure is 140/90 or higher. You have high blood pressure if your top number is 140 or higher or your bottom number is 90 or higher, or both. Many people fall into the category in between, called prehypertension. People with prehypertension need to make lifestyle changes to bring their blood pressure down and help prevent or delay high blood pressure. What happens when you have high blood pressure? · Blood flows through your arteries with too much force. Over time, this damages the walls of your arteries. But you can't feel it. High blood pressure usually doesn't cause symptoms. · Fat and calcium start to build up in your arteries. This buildup is called plaque. Plaque makes your arteries narrower and stiffer. Blood can't flow through them as easily. · This lack of good blood flow starts to damage some of the organs in your body. This can lead to problems such as coronary artery disease and heart attack, heart failure, stroke, kidney failure, and eye damage. How can you prevent high blood pressure? · Stay at a healthy weight. · Try to limit how much sodium you eat to less than 2,300 milligrams (mg) a day.  If you limit your sodium to 1,500 mg a day, you can lower your blood pressure even more. ¨ Buy foods that are labeled \"unsalted,\" \"sodium-free,\" or \"low-sodium. \" Foods labeled \"reduced-sodium\" and \"light sodium\" may still have too much sodium. ¨ Flavor your food with garlic, lemon juice, onion, vinegar, herbs, and spices instead of salt. Do not use soy sauce, steak sauce, onion salt, garlic salt, mustard, or ketchup on your food. ¨ Use less salt (or none) when recipes call for it. You can often use half the salt a recipe calls for without losing flavor. · Be physically active. Get at least 30 minutes of exercise on most days of the week. Walking is a good choice. You also may want to do other activities, such as running, swimming, cycling, or playing tennis or team sports. · Limit alcohol to 2 drinks a day for men and 1 drink a day for women. · Eat plenty of fruits, vegetables, and low-fat dairy products. Eat less saturated and total fats. How is high blood pressure treated? · Your doctor will suggest making lifestyle changes. For example, your doctor may ask you to eat healthy foods, quit smoking, lose extra weight, and be more active. · If lifestyle changes don't help enough or your blood pressure is very high, you will have to take medicine every day. Follow-up care is a key part of your treatment and safety. Be sure to make and go to all appointments, and call your doctor if you are having problems. It's also a good idea to know your test results and keep a list of the medicines you take. Where can you learn more? Go to http://mark-yanna.info/. Enter P501 in the search box to learn more about \"Learning About High Blood Pressure. \"  Current as of: September 21, 2016  Content Version: 11.4  © 4116-2429 [employer]. Care instructions adapted under license by [employer] (which disclaims liability or warranty for this information).  If you have questions about a medical condition or this instruction, always ask your healthcare professional. [address] any warranty or liability for your use of this information. Learning About Meal Planning for Diabetes  Why plan your meals? Meal planning can be a key part of managing diabetes. Planning meals and snacks with the right balance of carbohydrate, protein, and fat can help you keep your blood sugar at the target level you set with your doctor. You don't have to eat special foods. You can eat what your family eats, including sweets once in a while. But you do have to pay attention to how often you eat and how much you eat of certain foods. You may want to work with a dietitian or a certified diabetes educator. He or she can give you tips and meal ideas and can answer your questions about meal planning. This health professional can also help you reach a healthy weight if that is one of your goals. What plan is right for you? Your dietitian or diabetes educator may suggest that you start with the plate format or carbohydrate counting. The plate format  The plate format is a simple way to help you manage how you eat. You plan meals by learning how much space each food should take on a plate. Using the plate format helps you spread carbohydrate throughout the day. It can make it easier to keep your blood sugar level within your target range. It also helps you see if you're eating healthy portion sizes. To use the plate format, you put non-starchy vegetables on half your plate. Add meat or meat substitutes on one-quarter of the plate. Put a grain or starchy vegetable (such as brown rice or a potato) on the final quarter of the plate. You can add a small piece of fruit and some low-fat or fat-free milk or yogurt, depending on your carbohydrate goal for each meal.  Here are some tips for using the plate format:  · Make sure that you are not using an oversized plate. A 9-inch plate is best. Many restaurants use larger plates.   · Get used to using the plate format at home. Then you can use it when you eat out. · Write down your questions about using the plate format. Talk to your doctor, a dietitian, or a diabetes educator about your concerns. Carbohydrate counting  With carbohydrate counting, you plan meals based on the amount of carbohydrate in each food. Carbohydrate raises blood sugar higher and more quickly than any other nutrient. It is found in desserts, breads and cereals, and fruit. It's also found in starchy vegetables such as potatoes and corn, grains such as rice and pasta, and milk and yogurt. Spreading carbohydrate throughout the day helps keep your blood sugar levels within your target range. Your daily amount depends on several things, including your weight, how active you are, which diabetes medicines you take, and what your goals are for your blood sugar levels. A registered dietitian or diabetes educator can help you plan how much carbohydrate to include in each meal and snack. A guideline for your daily amount of carbohydrate is:  · 45 to 60 grams at each meal. That's about the same as 3 to 4 carbohydrate servings. · 15 to 20 grams at each snack. That's about the same as 1 carbohydrate serving. The Nutrition Facts label on packaged foods tells you how much carbohydrate is in a serving of the food. First, look at the serving size on the food label. Is that the amount you eat in a serving? All of the nutrition information on a food label is based on that serving size. So if you eat more or less than that, you'll need to adjust the other numbers. Total carbohydrate is the next thing you need to look for on the label. If you count carbohydrate servings, one serving of carbohydrate is 15 grams. For foods that don't come with labels, such as fresh fruits and vegetables, you'll need a guide that lists carbohydrate in these foods. Ask your doctor, dietitian, or diabetes educator about books or other nutrition guides you can use.   If you take insulin, you need to know how many grams of carbohydrate are in a meal. This lets you know how much rapid-acting insulin to take before you eat. If you use an insulin pump, you get a constant rate of insulin during the day. So the pump must be programmed at meals to give you extra insulin to cover the rise in blood sugar after meals. When you know how much carbohydrate you will eat, you can take the right amount of insulin. Or, if you always use the same amount of insulin, you need to make sure that you eat the same amount of carbohydrate at meals. If you need more help to understand carbohydrate counting and food labels, ask your doctor, dietitian, or diabetes educator. How do you get started with meal planning? Here are some tips to get started:  · Plan your meals a week at a time. Don't forget to include snacks too. · Use cookbooks or online recipes to plan several main meals. Plan some quick meals for busy nights. You also can double some recipes that freeze well. Then you can save half for other busy nights when you don't have time to cook. · Make sure you have the ingredients you need for your recipes. If you're running low on basic items, put these items on your shopping list too. · List foods that you use to make breakfasts, lunches, and snacks. List plenty of fruits and vegetables. · Post this list on the refrigerator. Add to it as you think of more things you need. · Take the list to the store to do your weekly shopping. Follow-up care is a key part of your treatment and safety. Be sure to make and go to all appointments, and call your doctor if you are having problems. It's also a good idea to know your test results and keep a list of the medicines you take. Where can you learn more? Go to http://mark-yanna.info/. [place] Free in the search box to learn more about \"Learning About Meal Planning for Diabetes. \"  Current as of: March 13, 2017  Content Version: 11.4  © 5278-4033 Healthwise Incorporated. Care instructions adapted under license by [employer]) (which disclaims liability or warranty for this information). If you have questions about a medical condition or this instruction, always ask your healthcare professional. [address] any warranty or liability for your use of this information. Home Blood Glucose Test: About This Test  What is it? A home blood glucose test measures the amount of a type of sugar, called glucose, in your blood. Why is this test done? People who have diabetes need to check the amount of glucose in their blood. A home blood glucose test is an easy way to test your blood at home or when you are away from home. The results help you know when to take action to keep your blood glucose levels in a target range. How can you prepare for the test?  · Check the expiration date on the bottle of testing strips. Do not use test strips that have . · Match the code number on the testing strips bottle with the number on the meter. If the numbers do not match, follow the directions with the meter for changing the code number. What happens before the test?  The supplies you will need for testing blood glucose include:  · A blood glucose meter. · Testing strips. These are made to be used with a specific model of meter. · Sugar control solutions. Some meters require a specific solution. Many new meters are made to operate without a control solution. · Short needles called lancets for pricking your skin. · A pen-sized coulter for the lancet (lancet device), which positions the lancet and controls how deeply it goes into your skin. · Clean cotton balls. These are used to stop the bleeding from the testing site. What happens during the test?  A home blood glucose test involves pricking your finger, palm, or forearm with a lancet to collect a drop of blood.  The blood drop is placed on a test strip, which you insert into the blood glucose meter. The instructions for testing are slightly different for each blood glucose meter model. Follow the instructions that came with your meter. · Wash your hands with warm, soapy water. Dry them well with a clean towel. You may also use an alcohol wipe to clean your finger or other site, but make sure your hands are dry before the test.  · Insert a clean lancet into the lancet device. · Remove a test strip from the test strip bottle. Replace the lid immediately to keep moisture away from the other strips. · Follow the instructions that came with your meter to get it ready. · Use the lancet device to stick the side of your fingertip with the lancet. Do not stick the tip of your finger. Some blood sugar meters use lancet devices that take the blood sample from other sites, such as the palm of the hand or the forearm. But the finger is usually the most accurate place to test blood sugar. · Put a drop of blood on the correct spot on the test strip. · Apply pressure with a clean cotton ball to stop the bleeding. · Follow the directions that came with the meter to get the results. · Write down the results and the time that you tested your blood. Some meters will store the results for you. What else should you know about the test?  The American Diabetes Association (ADA) recommends that you stay within the following blood glucose level ranges. But depending on your health, you and your doctor may set a different range for you. For nonpregnant adults with diabetes:  · 80 milligrams per deciliter (mg/dL) to 130 mg/dL before a meal  · Less than 180 mg/dL 1 to 2 hours after a meal  For women who have diabetes related to pregnancy (gestational diabetes):  · 95 mg/dL or less before breakfast  · 120 to 140 mg/dL (or lower) 1 to 2 hours after a meal  How long does the test take? · The blood glucose meter will show the results of the test in a minute or less. Where can you learn more?   Go to http://mark-yanna.info/. Enter S369 in the search box to learn more about \"Home Blood Glucose Test: About This Test.\"  Current as of: March 13, 2017  Content Version: 11.4  © 5090-6869 [employer]. Care instructions adapted under license by [employer] (which disclaims liability or warranty for this information). If you have questions about a medical condition or this instruction, always ask your healthcare professional. [address] any warranty or liability for your use of this information. Purine-Restricted Diet: Care Instructions  Your Care Instructions    Purines are substances that are found in some foods. Your body turns purines into uric acid. High levels of uric acid can cause gout, which is a form of arthritis that causes pain and inflammation in joints. You may be able to help control the amount of uric acid in your body by limiting high-purine foods in your diet. Follow-up care is a key part of your treatment and safety. Be sure to make and go to all appointments, and call your doctor if you are having problems. It's also a good idea to know your test results and keep a list of the medicines you take. How can you care for yourself at home? · Plan your meals and snacks around foods that are low in purines and are safe for you to eat. These foods include:  ¨ Green vegetables and tomatoes. ¨ Fruits. ¨ Whole-grain breads, rice, and cereals. ¨ Eggs, peanut butter, and nuts. ¨ Low-fat milk, cheese, and other milk products. ¨ Popcorn. ¨ Gelatin desserts, chocolate, cocoa, and cakes and sweets, in small amounts. · You can eat certain foods that are medium-high in purines, but eat them only once in a while. These foods include:  ¨ Legumes, such as dried beans and dried peas. You can have 1 cup cooked legumes each day. ¨ Asparagus, cauliflower, spinach, mushrooms, and green peas.   ¨ Fish and seafood (other than very high-purine seafood). ¨ Oatmeal, wheat bran, and wheat germ. · Limit very high-purine foods, including:  ¨ Organ meats, such as liver, kidneys, sweetbreads, and brains. ¨ Meats, including rodriguez, beef, pork, and lamb. ¨ Game meats and any other meats in large amounts. ¨ Anchovies, sardines, herring, mackerel, and scallops. ¨ Gravy. ¨ Beer. Where can you learn more? Go to http://mark-yanna.info/. Enter F448 in the search box to learn more about \"Purine-Restricted Diet: Care Instructions. \"  Current as of: May 12, 2017  Content Version: 11.4  © 9800-7447 Healthwise, [employer]. Care instructions adapted under license by [employer] (which disclaims liability or warranty for this information). If you have questions about a medical condition or this instruction, always ask your healthcare professional. [place] [address] any warranty or liability for your use of this information.

## 2018-07-06 LAB
ALBUMIN SERPL-MCNC: 4.2 G/DL (ref 3.5–5.5)
ALBUMIN/CREAT UR: 32.7 MG/G CREAT (ref 0–30)
ALBUMIN/GLOB SERPL: 1.3 {RATIO} (ref 1.2–2.2)
ALP SERPL-CCNC: 74 IU/L (ref 39–117)
ALT SERPL-CCNC: 50 IU/L (ref 0–44)
AST SERPL-CCNC: 38 IU/L (ref 0–40)
BILIRUB SERPL-MCNC: 0.6 MG/DL (ref 0–1.2)
BUN SERPL-MCNC: 8 MG/DL (ref 6–24)
BUN/CREAT SERPL: 9 (ref 9–20)
CALCIUM SERPL-MCNC: 9.5 MG/DL (ref 8.7–10.2)
CHLORIDE SERPL-SCNC: 98 MMOL/L (ref 96–106)
CHOLEST SERPL-MCNC: 219 MG/DL (ref 100–199)
CO2 SERPL-SCNC: 23 MMOL/L (ref 20–29)
CREAT SERPL-MCNC: 0.85 MG/DL (ref 0.76–1.27)
CREAT UR-MCNC: 137.5 MG/DL
GLOBULIN SER CALC-MCNC: 3.3 G/DL (ref 1.5–4.5)
GLUCOSE SERPL-MCNC: 234 MG/DL (ref 65–99)
HDLC SERPL-MCNC: 27 MG/DL
INTERPRETATION, 910389: NORMAL
LDLC SERPL CALC-MCNC: ABNORMAL MG/DL (ref 0–99)
Lab: NORMAL
MICROALBUMIN UR-MCNC: 45 UG/ML
POTASSIUM SERPL-SCNC: 4.1 MMOL/L (ref 3.5–5.2)
PROT SERPL-MCNC: 7.5 G/DL (ref 6–8.5)
SODIUM SERPL-SCNC: 140 MMOL/L (ref 134–144)
TRIGL SERPL-MCNC: 783 MG/DL (ref 0–149)
URATE SERPL-MCNC: 7.9 MG/DL (ref 3.7–8.6)
VLDLC SERPL CALC-MCNC: ABNORMAL MG/DL (ref 5–40)

## 2018-07-09 ENCOUNTER — OFFICE VISIT (OUTPATIENT)
Dept: INTERNAL MEDICINE CLINIC | Age: 43
End: 2018-07-09

## 2018-07-09 VITALS
HEIGHT: 72 IN | SYSTOLIC BLOOD PRESSURE: 153 MMHG | BODY MASS INDEX: 29.53 KG/M2 | RESPIRATION RATE: 16 BRPM | WEIGHT: 218 LBS | HEART RATE: 80 BPM | OXYGEN SATURATION: 98 % | DIASTOLIC BLOOD PRESSURE: 97 MMHG | TEMPERATURE: 97.8 F

## 2018-07-09 DIAGNOSIS — G47.00 INSOMNIA, UNSPECIFIED TYPE: ICD-10-CM

## 2018-07-09 DIAGNOSIS — E78.1 HYPERTRIGLYCERIDEMIA: ICD-10-CM

## 2018-07-09 DIAGNOSIS — M10.9 GOUT OF LEFT KNEE, UNSPECIFIED CAUSE, UNSPECIFIED CHRONICITY: ICD-10-CM

## 2018-07-09 DIAGNOSIS — I10 ESSENTIAL HYPERTENSION: ICD-10-CM

## 2018-07-09 DIAGNOSIS — E11.21 TYPE 2 DIABETES WITH NEPHROPATHY (HCC): Primary | ICD-10-CM

## 2018-07-09 PROBLEM — E11.9 DM (DIABETES MELLITUS) (HCC): Status: RESOLVED | Noted: 2018-06-01 | Resolved: 2018-07-09

## 2018-07-09 RX ORDER — FENOFIBRATE 145 MG/1
145 TABLET, COATED ORAL DAILY
Qty: 30 TAB | Refills: 5 | Status: SHIPPED | OUTPATIENT
Start: 2018-07-09 | End: 2018-11-12 | Stop reason: SDUPTHER

## 2018-07-09 RX ORDER — LANCETS 30 GAUGE
EACH MISCELLANEOUS
Refills: 11 | COMMUNITY
Start: 2018-06-11

## 2018-07-09 RX ORDER — AMITRIPTYLINE HYDROCHLORIDE 25 MG/1
25 TABLET, FILM COATED ORAL
Qty: 30 TAB | Refills: 3 | Status: SHIPPED | OUTPATIENT
Start: 2018-07-09 | End: 2018-11-12 | Stop reason: SDUPTHER

## 2018-07-09 NOTE — PROGRESS NOTES
Maria A Medina is a 38 y.o. male who presents today for Hypertension and Diabetes  . He has a history of   Patient Active Problem List   Diagnosis Code    Colitis K52.9    HTN (hypertension) I10    Gout M10.9    Inguinal hernia K40.90    Hepatic steatosis K76.0    Meckel diverticulum Q43.0    Type 2 diabetes with nephropathy ([address] E11.21    Hypertriglyceridemia E78.1   . Today patient is here for f/u. Higher stress at home. 15y. o son with gastroparesis and is on a purly liquid diet. Work a bit stressful. Hyperlipidemia - very high trigs. Not taking anything. Reviewed results. Lab Results   Component Value Date/Time    Cholesterol, total 219 (H) 07/05/2018 08:58 AM    HDL Cholesterol 27 (L) 07/05/2018 08:58 AM    LDL, calculated Comment 07/05/2018 08:58 AM    VLDL, calculated Comment 07/05/2018 08:58 AM    Triglyceride 783 (HH) 07/05/2018 08:58 AM       Hypertension - elevated at home. Checking on occasions. Hypertension ROS: taking medications as instructed, no medication side effects noted, no TIA's, no chest pain on exertion, no dyspnea on exertion, no swelling of ankles     reports that he has never smoked. He has never used smokeless tobacco.    reports that he drinks about 1.2 oz of alcohol per week    BP Readings from Last 2 Encounters:   07/09/18 (!) 153/97   06/11/18 140/90     Diabetes Mellitus follow-up - started on metformin 500mg BID. Fasting BG have not been checked. Last hemoglobin a1c   Lab Results   Component Value Date/Time    Hemoglobin A1c 8.3 (H) 05/24/2018 06:47 PM     No components found for: POCA1C, HBA1C POC  medication compliance: compliant all of the time. Diabetic diet compliance: compliant most of the time. Home glucose monitoring: fasting values range Not checking. Hypoglycemic episodes:  none. Further diabetic ROS: no polyuria or polydipsia, no chest pain, dyspnea or TIA's, no numbness, tingling or pain in extremities.    Diabetes Complications: proteinuria. Last Eye Exam: Due  Last Foot Exam: UTD. Microalbuminuria:   Lab Results   Component Value Date/Time    Microalb/Creat ratio (ug/mg creat.) 32.7 (H) 07/05/2018 08:58 AM     Pt is hesitant to add new meds. No gout flares since hospitalization. ROS  Review of Systems   Constitutional: Negative for chills, fever, malaise/fatigue and weight loss. HENT: Negative for ear discharge, ear pain, hearing loss, nosebleeds and tinnitus. Eyes: Negative for blurred vision, double vision, photophobia and pain. Respiratory: Negative for cough, hemoptysis, sputum production and shortness of breath. Cardiovascular: Negative for chest pain, palpitations, orthopnea, claudication and leg swelling. Gastrointestinal: Negative for abdominal pain, constipation, diarrhea, heartburn, nausea and vomiting. Genitourinary: Negative for dysuria, frequency and urgency. Musculoskeletal: Negative for back pain, myalgias and neck pain. Neurological: Negative. Endo/Heme/Allergies: Does not bruise/bleed easily. Psychiatric/Behavioral: Negative for depression and memory loss. The patient is nervous/anxious (stress is up a bit. ) and has insomnia. Visit Vitals    BP (!) 153/97 (BP 1 Location: Left arm, BP Patient Position: Sitting)    Pulse 80    Temp 97.8 °F (36.6 °C) (Oral)    Resp 16    Ht 6' (1.829 m)    Wt 218 lb (98.9 kg)    SpO2 98%    BMI 29.57 kg/m2       Physical Exam   Constitutional: He is oriented to person, place, and time. He appears well-developed and well-nourished. HENT:   Head: Normocephalic and atraumatic. Eyes: EOM are normal. Pupils are equal, round, and reactive to light. Cardiovascular: Normal rate and regular rhythm. No murmur heard. Pulmonary/Chest: Effort normal and breath sounds normal. No respiratory distress. Abdominal: Soft. Bowel sounds are normal. He exhibits no distension. Musculoskeletal: Normal range of motion. He exhibits no edema. Tophi to R elbow. Neurological: He is alert and oriented to person, place, and time. Skin: Skin is warm and dry. He is not diaphoretic. Psychiatric: He has a normal mood and affect. His behavior is normal.         Current Outpatient Prescriptions   Medication Sig    ONETOUCH DELICA LANCETS 30 gauge misc CHECK FASTING BLOOD GLUCOSE DAILY.  fenofibrate nanocrystallized (TRICOR) 145 mg tablet Take 1 Tab by mouth daily.  amitriptyline (ELAVIL) 25 mg tablet Take 1 Tab by mouth nightly.  metFORMIN ER (GLUCOPHAGE XR) 500 mg tablet Take 2 Tabs by mouth daily (with dinner).  Blood-Glucose Meter monitoring kit Check fasting BG daily.  Lancets misc Check fasting BG daily.  glucose blood VI test strips (ASCENSIA AUTODISC VI, ONE TOUCH ULTRA TEST VI) strip Check fasting BG daily.  indomethacin (INDOCIN) 50 mg capsule Take 1 Cap by mouth three (3) times daily.  colchicine 0.6 mg tablet Take 0.6 mg by mouth daily.  omeprazole (PRILOSEC) 20 mg capsule Take 20 mg by mouth daily. No current facility-administered medications for this visit. Past Medical History:   Diagnosis Date    Arthritis     Diabetes [facility] [address]     Gout     Gout     Hiatal hernia     Hypertension       Past Surgical History:   Procedure Laterality Date    HX WISDOM TEETH EXTRACTION        Social History   Substance Use Topics    Smoking status: Never Smoker    Smokeless tobacco: Never Used    Alcohol use 1.2 oz/week     2 Glasses of wine per week      Family History   Problem Relation Age of Onset    Hypertension Mother     Hypertension Father     Diabetes Maternal Aunt     Diabetes Maternal Uncle     Diabetes Maternal Grandmother     Diabetes Maternal Grandfather     Diabetes Paternal Grandmother     Diabetes Paternal Grandfather         No Known Allergies     Assessment/Plan  Diagnoses and all orders for this visit:    1. Type 2 diabetes with nephropathy (HCC) - Hesitant to start too many meds.  Not checking. Tolerating metformin.   -     HEMOGLOBIN A1C WITH EAG; Future    2. Essential hypertension - still high and is at home. Will give 3 mos of lifestyle changes    3. Gout of left knee, unspecified cause, unspecified chronicity - No flares. Suggest treatment given tophi, but wants to monitor. 4. Hypertriglyceridemia - discussed my concern for pancreatitis. Will start tricor.   -     fenofibrate nanocrystallized (TRICOR) 145 mg tablet; Take 1 Tab by mouth daily.  -     LIPID PANEL; Future  -     METABOLIC PANEL, BASIC; Future    5. Insomnia, unspecified type - higher stress. PRN TCA. -     amitriptyline (ELAVIL) 25 mg tablet; Take 1 Tab by mouth nightly. Follow-up Disposition:  Return in about 3 months (around 10/9/2018).     Darrion Da Silva MD  7/9/2018

## 2018-07-09 NOTE — PATIENT INSTRUCTIONS
Hyperlipidemia: After Your Visit  Your Care Instructions  Hyperlipidemia is too much fat in your blood. The body has several kinds of fat, including cholesterol and triglycerides. Your body needs fat for many things, such as making new cells. But too much fat in your blood increases your chances of having a heart attack or stroke. You may be able to lower your cholesterol and triglycerides with a heart-healthy diet, exercise, and if needed, medicine. Your doctor may want you to try lifestyle changes first to see whether they lower the fat in your blood. You may need to take medicine if lifestyle changes do not lower the fat in your blood enough. Follow-up care is a key part of your treatment and safety. Be sure to make and go to all appointments, and call your doctor if you are having problems. Its also a good idea to know your test results and keep a list of the medicines you take. How can you care for yourself at home? Take your medicines  · Take your medicines exactly as prescribed. Call your doctor if you think you are having a problem with your medicine. · If you take medicine to lower your cholesterol, go to follow-up visits. You will need to have blood tests. · Do not take large doses of niacin, which is a B vitamin, while taking medicine called statins. It may increase the chance of muscle pain and liver problems. · Talk to your doctor about avoiding grapefruit juice if you are taking statins. Grapefruit juice can raise the level of this medicine in your blood. This could increase side effects. Eat more fruits, vegetables, and fiber  · Fruits and vegetables have lots of nutrients that help protect against heart disease, and they have little--if any--fat. Try to eat at least five servings a day. Dark green, deep orange, or yellow fruits and vegetables are healthy choices. · Keep carrots, celery, and other veggies handy for snacks.  Buy fruit that is in season and store it where you can see it so that you will be tempted to eat it. Cook dishes that have a lot of veggies in them, such as stir-fries and soups. · Foods high in fiber may reduce your cholesterol and provide important vitamins and minerals. High-fiber foods include whole-grain cereals and breads, oatmeal, beans, brown rice, citrus fruits, and apples. · Buy whole-grain breads and cereals instead of white bread and pastries. Limit saturated fat  · Read food labels and try to avoid saturated fat and trans fat. They increase your risk of heart disease. · Use olive or canola oil when you cook. Try cholesterol-lowering spreads, such as Benecol or Take Control. · Bake, broil, grill, or steam foods instead of frying them. · Limit the amount of high-fat meats you eat, including hot dogs and sausages. Cut out all visible fat when you prepare meat. · Eat fish, skinless poultry, and soy products such as tofu instead of high-fat meats. Soybeans may be especially good for your heart. Eat at least two servings of fish a week. Certain fish, such as salmon, contain omega-3 fatty acids, which may help reduce your risk of heart attack. · Choose low-fat or fat-free milk and dairy products. Get exercise, limit alcohol, and quit smoking  · Get more exercise. Work with your doctor to set up an exercise program. Even if you can do only a small amount, exercise will help you get stronger, have more energy, and manage your weight and your stress. Walking is an easy way to get exercise. Gradually increase the amount you walk every day. Aim for at least 30 minutes on most days of the week. You also may want to swim, bike, or do other activities. · Limit alcohol to no more than 2 drinks a day for men and 1 drink a day for women. · Do not smoke. If you need help quitting, talk to your doctor about stop-smoking programs and medicines. These can increase your chances of quitting for good. When should you call for help?   Call 911 anytime you think you may need emergency care. For example, call if:  · You have symptoms of a heart attack. These may include:  ¨ Chest pain or pressure, or a strange feeling in the chest.  ¨ Sweating. ¨ Shortness of breath. ¨ Nausea or vomiting. ¨ Pain, pressure, or a strange feeling in the back, neck, jaw, or upper belly or in one or both shoulders or arms. ¨ Lightheadedness or sudden weakness. ¨ A fast or irregular heartbeat. After you call 911, the  may tell you to chew 1 adult-strength or 2 to 4 low-dose aspirin. Wait for an ambulance. Do not try to drive yourself. · You have signs of a stroke. These may include:  ¨ Sudden numbness, paralysis, or weakness in your face, arm, or leg, especially on only one side of your body. ¨ New problems with walking or balance. ¨ Sudden vision changes. ¨ Drooling or slurred speech. ¨ New problems speaking or understanding simple statements, or feeling confused. ¨ A sudden, severe headache that is different from past headaches. · You passed out (lost consciousness). Call your doctor now or seek immediate medical care if:  · You have muscle pain or weakness. Watch closely for changes in your health, and be sure to contact your doctor if:  · You are very tired. · You have an upset stomach, gas, constipation, or belly pain or cramps. Where can you learn more? Go to [employer]  Enter C406 in the search box to learn more about \"Hyperlipidemia: After Your Visit. \"   © 5508-5287 Healthwise, Incorporated. Care instructions adapted under license by Tyler Corrigan (which disclaims liability or warranty for this information). This care instruction is for use with your licensed healthcare professional. If you have questions about a medical condition or this instruction, always ask your healthcare professional. [place] [address] any warranty or liability for your use of this information.   Content Version: 9.3.613235; Last Revised: October 13, 2011

## 2018-07-09 NOTE — MR AVS SNAPSHOT
[address] [place] [address] 
 
 
 [address] [facility] [address] [facility] 
[phone] Patient: Jacob Carrasquillo III 
MRN: [id number] Visit Information Date & Time Provider Department Dept. Phone Encounter #  
 7/9/2018  9:00 AM Frances Simmonds, MD [facility] [address] [id number] Upcoming Health Maintenance Date Due  
 EYE EXAM RETINAL OR DILATED Q1 9/19/1983 Pneumococcal 19-64 Medium Risk (1 of 1 - PPSV23) 9/19/1992 DTaP/Tdap/Td series (1 - Tdap) 9/19/1994 Influenza Age 5 to Adult 8/1/2018 HEMOGLOBIN A1C Q6M 11/24/2018 FOOT EXAM Q1 6/11/2019 MICROALBUMIN Q1 7/5/2019 LIPID PANEL Q1 7/5/2019 Allergies as of 7/9/2018  Review Complete On: 9/0/1673 By: Gracia Inman Wears No Known Allergies Current Immunizations  Never Reviewed No immunizations on file. Not reviewed this visit You Were Diagnosed With   
  
 Codes Comments Type 2 diabetes with nephropathy (HCC)    -  Primary ICD-10-CM: E11.21 
ICD-9-CM: 250.40, 583.81 Essential hypertension     ICD-10-CM: I10 
ICD-9-CM: 401.9 Gout of left knee, unspecified cause, unspecified chronicity     ICD-10-CM: M10.9 ICD-9-CM: 274.9 Hypertriglyceridemia     ICD-10-CM: E78.1 ICD-9-CM: 272.1 Insomnia, unspecified type     ICD-10-CM: G47.00 ICD-9-CM: 780.52 Vitals BP Pulse Temp Resp Height(growth percentile) Weight(growth percentile) (!) 153/97 (BP 1 Location: Left arm, BP Patient Position: Sitting) 80 97.8 °F (36.6 °C) (Oral) 16 6' (1.829 m) 218 lb (98.9 kg) SpO2 BMI Smoking Status 98% 29.57 kg/m2 Never Smoker Vitals History BMI and BSA Data Body Mass Index Body Surface Area  
 29.57 kg/m 2 2.24 m 2 Preferred Pharmacy Pharmacy Name Phone [id number] [place] [address] [facility] Your Updated Medication List  
  
   
 This list is accurate as of 7/9/18  9:22 AM.  Always use your most recent med list.  
  
  
  
  
 amitriptyline 25 mg tablet Commonly known as:  ELAVIL Take 1 Tab by mouth nightly. Blood-Glucose Meter monitoring kit Check fasting BG daily. colchicine 0.6 mg tablet Take 0.6 mg by mouth daily. fenofibrate nanocrystallized 145 mg tablet Commonly known as:  [employer] Take 1 Tab by mouth daily. glucose blood VI test strips strip Commonly known as:  ASCENSIA AUTODISC VI, ONE TOUCH ULTRA TEST VI Check fasting BG daily. indomethacin 50 mg capsule Commonly known as:  INDOCIN Take 1 Cap by mouth three (3) times daily. * Lancets Misc Check fasting BG daily. * ONETOUCH DELICA LANCETS 30 gauge Misc Generic drug:  lancets CHECK FASTING BLOOD GLUCOSE DAILY. metFORMIN  mg tablet Commonly known as:  GLUCOPHAGE XR Take 2 Tabs by mouth daily (with dinner). omeprazole 20 mg capsule Commonly known as:  PRILOSEC Take 20 mg by mouth daily. * Notice: This list has 2 medication(s) that are the same as other medications prescribed for you. Read the directions carefully, and ask your doctor or other care provider to review them with you. Prescriptions Sent to Pharmacy Refills  
 fenofibrate nanocrystallized (TRICOR) 145 mg tablet 5 Sig: Take 1 Tab by mouth daily. Class: Normal  
 Pharmacy: [address] [place] [address] Ph #: [phone] Route: Oral  
 amitriptyline (ELAVIL) 25 mg tablet 3 Sig: Take 1 Tab by mouth nightly. Class: Normal  
 Pharmacy: [address] [place] [address] Ph #: [phone] Route: Oral  
  
To-Do List   
 Around 09/19/2018 Lab:  HEMOGLOBIN A1C WITH EAG Around 09/19/2018 Lab:  LIPID PANEL Around 09/19/2018 Lab:  METABOLIC PANEL, BASIC Patient Instructions Hyperlipidemia: After Your Visit Your Care Instructions Hyperlipidemia is too much fat in your blood. The body has several kinds of fat, including cholesterol and triglycerides. Your body needs fat for many things, such as making new cells. But too much fat in your blood increases your chances of having a heart attack or stroke. You may be able to lower your cholesterol and triglycerides with a heart-healthy diet, exercise, and if needed, medicine. Your doctor may want you to try lifestyle changes first to see whether they lower the fat in your blood. You may need to take medicine if lifestyle changes do not lower the fat in your blood enough. Follow-up care is a key part of your treatment and safety. Be sure to make and go to all appointments, and call your doctor if you are having problems. Its also a good idea to know your test results and keep a list of the medicines you take. How can you care for yourself at home? Take your medicines · Take your medicines exactly as prescribed. Call your doctor if you think you are having a problem with your medicine. · If you take medicine to lower your cholesterol, go to follow-up visits. You will need to have blood tests. · Do not take large doses of niacin, which is a B vitamin, while taking medicine called statins. It may increase the chance of muscle pain and liver problems. · Talk to your doctor about avoiding grapefruit juice if you are taking statins. Grapefruit juice can raise the level of this medicine in your blood. This could increase side effects. Eat more fruits, vegetables, and fiber · Fruits and vegetables have lots of nutrients that help protect against heart disease, and they have littleif anyfat. Try to eat at least five servings a day. Dark green, deep orange, or yellow fruits and vegetables are healthy choices. · Keep carrots, celery, and other veggies handy for snacks.  Buy fruit that is in season and store it where you can see it so that you will be tempted to eat it. Cook dishes that have a lot of veggies in them, such as stir-fries and soups. · Foods high in fiber may reduce your cholesterol and provide important vitamins and minerals. High-fiber foods include whole-grain cereals and breads, oatmeal, beans, brown rice, citrus fruits, and apples. · Buy whole-grain breads and cereals instead of white bread and pastries. Limit saturated fat · Read food labels and try to avoid saturated fat and trans fat. They increase your risk of heart disease. · Use olive or canola oil when you cook. Try cholesterol-lowering spreads, such as Benecol or Take Control. · Bake, broil, grill, or steam foods instead of frying them. · Limit the amount of high-fat meats you eat, including hot dogs and sausages. Cut out all visible fat when you prepare meat. · Eat fish, skinless poultry, and soy products such as tofu instead of high-fat meats. Soybeans may be especially good for your heart. Eat at least two servings of fish a week. Certain fish, such as salmon, contain omega-3 fatty acids, which may help reduce your risk of heart attack. · Choose low-fat or fat-free milk and dairy products. Get exercise, limit alcohol, and quit smoking · Get more exercise. Work with your doctor to set up an exercise program. Even if you can do only a small amount, exercise will help you get stronger, have more energy, and manage your weight and your stress. Walking is an easy way to get exercise. Gradually increase the amount you walk every day. Aim for at least 30 minutes on most days of the week. You also may want to swim, bike, or do other activities. · Limit alcohol to no more than 2 drinks a day for men and 1 drink a day for women. · Do not smoke. If you need help quitting, talk to your doctor about stop-smoking programs and medicines. These can increase your chances of quitting for good. When should you call for help? Call 911 anytime you think you may need emergency care. For example, call if: 
· You have symptoms of a heart attack. These may include: ¨ Chest pain or pressure, or a strange feeling in the chest. 
¨ Sweating. ¨ Shortness of breath. ¨ Nausea or vomiting. ¨ Pain, pressure, or a strange feeling in the back, neck, jaw, or upper belly or in one or both shoulders or arms. ¨ Lightheadedness or sudden weakness. ¨ A fast or irregular heartbeat. After you call 911, the  may tell you to chew 1 adult-strength or 2 to 4 low-dose aspirin. Wait for an ambulance. Do not try to drive yourself. · You have signs of a stroke. These may include: 
¨ Sudden numbness, paralysis, or weakness in your face, arm, or leg, especially on only one side of your body. ¨ New problems with walking or balance. ¨ Sudden vision changes. ¨ Drooling or slurred speech. ¨ New problems speaking or understanding simple statements, or feeling confused. ¨ A sudden, severe headache that is different from past headaches. · You passed out (lost consciousness). Call your doctor now or seek immediate medical care if: 
· You have muscle pain or weakness. Watch closely for changes in your health, and be sure to contact your doctor if: 
· You are very tired. · You have an upset stomach, gas, constipation, or belly pain or cramps. Where can you learn more? Go to [employer].be Enter C406 in the search box to learn more about \"Hyperlipidemia: After Your Visit. \"  
© 6770-2176 Healthwise, Incorporated. Care instructions adapted under license by Estela Patel (which disclaims liability or warranty for this information). This care instruction is for use with your licensed healthcare professional. If you have questions about a medical condition or this instruction, always ask your healthcare professional. [address] any warranty or liability for your use of this information. Content Version: 4.3.996684; Last Revised: October 13, 2011 Introducing [facility] SERVICES! Dear Александр Hernandez: Thank you for requesting a [employer] account. Our records indicate that you already have an active IngagePatient account. You can access your account anytime at [email or web address] Did you know that you can access your hospital and ER discharge instructions at any time in In[employer]Patient? You can also review all of your test results from your hospital stay or ER visit. Additional Information If you have questions, please visit the Frequently Asked Questions section of the IngagePatient website at [email or web address] Remember, IngagePatient is NOT to be used for urgent needs. For medical emergencies, dial 911. Now available from your iPhone and Android! Please provide this summary of care documentation to your next provider. Your primary care clinician is listed as Jina Jimenez. If you have any questions after today's visit, please call [phone].

## 2018-09-19 DIAGNOSIS — E11.21 TYPE 2 DIABETES WITH NEPHROPATHY (HCC): ICD-10-CM

## 2018-09-19 DIAGNOSIS — E78.1 HYPERTRIGLYCERIDEMIA: ICD-10-CM

## 2018-11-12 DIAGNOSIS — G47.00 INSOMNIA, UNSPECIFIED TYPE: ICD-10-CM

## 2018-11-12 DIAGNOSIS — E78.1 HYPERTRIGLYCERIDEMIA: ICD-10-CM

## 2018-11-12 RX ORDER — AMITRIPTYLINE HYDROCHLORIDE 25 MG/1
25 TABLET, FILM COATED ORAL
Qty: 30 TAB | Refills: 0 | Status: SHIPPED | OUTPATIENT
Start: 2018-11-12 | End: 2019-12-18

## 2018-11-12 RX ORDER — FENOFIBRATE 145 MG/1
145 TABLET, COATED ORAL DAILY
Qty: 30 TAB | Refills: 5 | Status: SHIPPED | OUTPATIENT
Start: 2018-11-12 | End: 2020-02-14

## 2019-01-25 DIAGNOSIS — E11.8 TYPE 2 DIABETES MELLITUS WITH COMPLICATION, WITHOUT LONG-TERM CURRENT USE OF INSULIN (HCC): ICD-10-CM

## 2019-01-25 RX ORDER — METFORMIN HYDROCHLORIDE 500 MG/1
1000 TABLET, EXTENDED RELEASE ORAL
Qty: 60 TAB | Refills: 1 | Status: SHIPPED | OUTPATIENT
Start: 2019-01-25 | End: 2019-12-19 | Stop reason: SDUPTHER

## 2019-01-25 NOTE — TELEPHONE ENCOUNTER
Received refill request from [address] [place] [address] for metformin 500 mg 90 day supply. Pt is overdue for f/u and labs. LVM advising Pt to R/C to office and schedule appt.

## 2019-02-11 ENCOUNTER — TELEPHONE (OUTPATIENT)
Dept: INTERNAL MEDICINE CLINIC | Age: 44
End: 2019-02-11

## 2019-02-11 NOTE — TELEPHONE ENCOUNTER
Received medication refill request for metformin. Pt is overdue for f/u and labs, but works out of town. LVM for Pt to R/C to office and schedule appt.

## 2019-12-18 ENCOUNTER — HOSPITAL ENCOUNTER (OUTPATIENT)
Dept: LAB | Age: 44
Discharge: HOME | End: 2019-12-18

## 2019-12-18 ENCOUNTER — OFFICE VISIT (OUTPATIENT)
Dept: INTERNAL MEDICINE CLINIC | Age: 44
End: 2019-12-18

## 2019-12-18 VITALS
WEIGHT: 223 LBS | RESPIRATION RATE: 16 BRPM | BODY MASS INDEX: 30.2 KG/M2 | OXYGEN SATURATION: 99 % | SYSTOLIC BLOOD PRESSURE: 138 MMHG | TEMPERATURE: 97.8 F | DIASTOLIC BLOOD PRESSURE: 92 MMHG | HEART RATE: 88 BPM | HEIGHT: 72 IN

## 2019-12-18 DIAGNOSIS — E78.1 HYPERTRIGLYCERIDEMIA: ICD-10-CM

## 2019-12-18 DIAGNOSIS — E11.21 TYPE 2 DIABETES WITH NEPHROPATHY (HCC): Primary | ICD-10-CM

## 2019-12-18 DIAGNOSIS — M10.9 GOUT OF LEFT KNEE, UNSPECIFIED CAUSE, UNSPECIFIED CHRONICITY: ICD-10-CM

## 2019-12-18 DIAGNOSIS — E11.21 TYPE 2 DIABETES WITH NEPHROPATHY (HCC): ICD-10-CM

## 2019-12-18 DIAGNOSIS — I10 ESSENTIAL HYPERTENSION: ICD-10-CM

## 2019-12-18 DIAGNOSIS — G62.9 NEUROPATHY: ICD-10-CM

## 2019-12-18 PROBLEM — E11.40 TYPE 2 DIABETES MELLITUS WITH DIABETIC NEUROPATHY (HCC): Status: ACTIVE | Noted: 2019-12-18

## 2019-12-18 LAB
ALBUMIN SERPL-MCNC: 4.5 G/DL (ref 3.5–5)
ALBUMIN/GLOB SERPL: 1.2 {RATIO} (ref 1.1–2.2)
ALP SERPL-CCNC: 53 U/L (ref 45–117)
ALT SERPL-CCNC: 147 U/L (ref 12–78)
ANION GAP SERPL CALC-SCNC: 6 MMOL/L (ref 5–15)
AST SERPL-CCNC: 108 U/L (ref 15–37)
BASOPHILS # BLD: 0.1 K/UL (ref 0–0.1)
BASOPHILS NFR BLD: 2 % (ref 0–1)
BILIRUB SERPL-MCNC: 1 MG/DL (ref 0.2–1)
BUN SERPL-MCNC: 9 MG/DL (ref 6–20)
BUN/CREAT SERPL: 9 (ref 12–20)
CALCIUM SERPL-MCNC: 9.6 MG/DL (ref 8.5–10.1)
CHLORIDE SERPL-SCNC: 99 MMOL/L (ref 97–108)
CHOLEST SERPL-MCNC: 212 MG/DL
CO2 SERPL-SCNC: 30 MMOL/L (ref 21–32)
CREAT SERPL-MCNC: 0.98 MG/DL (ref 0.7–1.3)
CREAT UR-MCNC: 28.9 MG/DL
DIFFERENTIAL METHOD BLD: ABNORMAL
EOSINOPHIL # BLD: 0.1 K/UL (ref 0–0.4)
EOSINOPHIL NFR BLD: 3 % (ref 0–7)
ERYTHROCYTE [DISTWIDTH] IN BLOOD BY AUTOMATED COUNT: 12 % (ref 11.5–14.5)
EST. AVERAGE GLUCOSE BLD GHB EST-MCNC: 206 MG/DL
GLOBULIN SER CALC-MCNC: 3.8 G/DL (ref 2–4)
GLUCOSE SERPL-MCNC: 237 MG/DL (ref 65–100)
HBA1C MFR BLD: 8.8 % (ref 4–5.6)
HCT VFR BLD AUTO: 49.2 % (ref 36.6–50.3)
HDLC SERPL-MCNC: 33 MG/DL
HDLC SERPL: 6.4 {RATIO} (ref 0–5)
HGB BLD-MCNC: 16.4 G/DL (ref 12.1–17)
IMM GRANULOCYTES # BLD AUTO: 0 K/UL (ref 0–0.04)
IMM GRANULOCYTES NFR BLD AUTO: 1 % (ref 0–0.5)
LDLC SERPL CALC-MCNC: 113 MG/DL (ref 0–100)
LIPID PROFILE,FLP: ABNORMAL
LYMPHOCYTES # BLD: 1.4 K/UL (ref 0.8–3.5)
LYMPHOCYTES NFR BLD: 32 % (ref 12–49)
MCH RBC QN AUTO: 32.5 PG (ref 26–34)
MCHC RBC AUTO-ENTMCNC: 33.3 G/DL (ref 30–36.5)
MCV RBC AUTO: 97.4 FL (ref 80–99)
MICROALBUMIN UR-MCNC: 0.64 MG/DL
MICROALBUMIN/CREAT UR-RTO: 22 MG/G (ref 0–30)
MONOCYTES # BLD: 0.5 K/UL (ref 0–1)
MONOCYTES NFR BLD: 12 % (ref 5–13)
NEUTS SEG # BLD: 2.2 K/UL (ref 1.8–8)
NEUTS SEG NFR BLD: 50 % (ref 32–75)
NRBC # BLD: 0 K/UL (ref 0–0.01)
NRBC BLD-RTO: 0 PER 100 WBC
PLATELET # BLD AUTO: 180 K/UL (ref 150–400)
PMV BLD AUTO: 10.7 FL (ref 8.9–12.9)
POTASSIUM SERPL-SCNC: 4 MMOL/L (ref 3.5–5.1)
PROT SERPL-MCNC: 8.3 G/DL (ref 6.4–8.2)
RBC # BLD AUTO: 5.05 M/UL (ref 4.1–5.7)
SODIUM SERPL-SCNC: 135 MMOL/L (ref 136–145)
TRIGL SERPL-MCNC: 330 MG/DL (ref ?–150)
URATE SERPL-MCNC: 5 MG/DL (ref 3.5–7.2)
VLDLC SERPL CALC-MCNC: 66 MG/DL
WBC # BLD AUTO: 4.4 K/UL (ref 4.1–11.1)

## 2019-12-18 RX ORDER — AMLODIPINE BESYLATE 5 MG/1
5 TABLET ORAL DAILY
Qty: 90 TAB | Refills: 1 | Status: SHIPPED | OUTPATIENT
Start: 2019-12-18 | End: 2020-01-21 | Stop reason: SDUPTHER

## 2019-12-18 RX ORDER — COLCHICINE 0.6 MG/1
0.6 TABLET ORAL DAILY
Qty: 90 TAB | Refills: 1 | Status: SHIPPED | OUTPATIENT
Start: 2019-12-18 | End: 2019-12-19

## 2019-12-18 RX ORDER — GABAPENTIN 300 MG/1
300 CAPSULE ORAL 2 TIMES DAILY
Qty: 180 CAP | Refills: 1 | Status: SHIPPED | OUTPATIENT
Start: 2019-12-18 | End: 2020-03-05 | Stop reason: SDUPTHER

## 2019-12-18 NOTE — PROGRESS NOTES
Yusra Ruff is a 53 y.o. male who presents today for Diabetes; Numbness; and Foot Pain  . He has a history of   Patient Active Problem List   Diagnosis Code    Colitis K52.9    HTN (hypertension) I10    Gout M10.9    Inguinal hernia K40.90    Hepatic steatosis K76.0    Meckel diverticulum Q43.0    Type 2 diabetes with nephropathy (HCC) E11.21    Hypertriglyceridemia E78.1    Gastroesophageal reflux disease K21.9   . Today patient is here for follow-up. Emma Worthy He has not followed up in some time. He did recently take a job and he did not have insurance previous to this. Diabetes Mellitus [id number] longer taking metformin. Weight is up a bit. Patient is experiencing a lot more neuropathy. This is mainly located in his bilateral feet. This is quite uncomfortable. Last hemoglobin a1c   Lab Results   Component Value Date/Time    Hemoglobin A1c 8.3 (H) 05/24/2018 06:47 PM     No components found for: POCA1C, HBA1C POC  medication compliance: compliant all of the time. Diabetic diet compliance: compliant most of the time. Home glucose monitoring: fasting values range not checking. Further diabetic ROS: no polyuria or polydipsia, no chest pain, dyspnea or TIA's, Is having neuropathy. Microalbuminuria:   Lab Results   Component Value Date/Time    Microalb/Creat ratio (ug/mg creat.) 32.7 (H) 07/05/2018 08:58 AM     Hypertension-on no therapy. Blood pressure is elevated today. reports that he has never smoked. He has never used smokeless tobacco.    reports current alcohol use of about 2.0 standard drinks of alcohol per week. BP Readings from Last 2 Encounters:   12/18/19 (!) 138/92   07/09/18 (!) 153/97     Hyperlipidemia  We had placed him on a fenofibrate. He has not taken this.   Lab Results   Component Value Date/Time    Cholesterol, total 219 (H) 07/05/2018 08:58 AM    HDL Cholesterol 27 (L) 07/05/2018 08:58 AM    LDL, calculated Comment 07/05/2018 08:58 AM    VLDL, calculated Comment 07/05/2018 08:58 AM    Triglyceride 783 (HH) 07/05/2018 08:58 AM     Gout: Patient continues to take colchicine daily. He has not had a gouty attack in some time. ROS  Review of Systems   Constitutional: Negative for chills, fever and weight loss. HENT: Negative for congestion and sore throat. Eyes: Negative for blurred vision, double vision and photophobia. Respiratory: Negative for cough and shortness of breath. Cardiovascular: Negative for chest pain, palpitations and leg swelling. Gastrointestinal: Negative for abdominal pain, constipation, diarrhea, heartburn, nausea and vomiting. Genitourinary: Negative for dysuria, frequency and urgency. Musculoskeletal: Negative for joint pain and myalgias. Neuropathy to bilateral feet. Cold and painful at times. Skin: Negative for rash. Neurological: Negative. Negative for headaches. Endo/Heme/Allergies: Does not bruise/bleed easily. Psychiatric/Behavioral: Negative for memory loss and suicidal ideas. Visit Vitals  BP (!) 138/92 (BP 1 Location: Left arm, BP Patient Position: Sitting)   Pulse 88   Temp 97.8 °F (36.6 °C) (Oral)   Resp 16   Ht 6' (1.829 m)   Wt 223 lb (101.2 kg)   SpO2 99%   BMI 30.24 kg/m²       Physical Exam  Constitutional:       Appearance: He is well-developed. He is not diaphoretic. HENT:      Head: Normocephalic and atraumatic. Eyes:      Pupils: Pupils are equal, round, and reactive to light. Cardiovascular:      Rate and Rhythm: Normal rate and regular rhythm. Heart sounds: No murmur. Pulmonary:      Effort: Pulmonary effort is normal. No respiratory distress. Breath sounds: Normal breath sounds. Musculoskeletal: Normal range of motion. Comments: Decrease sensation to bilateral feet. Mildly discolored. His feet are colder than the rest of his body sores hands. Denies any pain currently. Skin:     General: Skin is warm and dry.    Neurological:      Mental Status: He is alert and oriented to person, place, and time. Psychiatric:         Behavior: Behavior normal.           Current Outpatient Medications   Medication Sig    metFORMIN ER (GLUCOPHAGE XR) 500 mg tablet Take 2 Tabs by mouth daily (with dinner). Appointment needed for additional refills    amitriptyline (ELAVIL) 25 mg tablet Take 1 Tab by mouth nightly.  fenofibrate nanocrystallized (TRICOR) 145 mg tablet Take 1 Tab by mouth daily.  ONETOUCH DELICA LANCETS 30 gauge misc CHECK FASTING BLOOD GLUCOSE DAILY.  Blood-Glucose Meter monitoring kit Check fasting BG daily.  Lancets misc Check fasting BG daily.  glucose blood VI test strips (ASCENSIA AUTODISC VI, ONE TOUCH ULTRA TEST VI) strip Check fasting BG daily.  indomethacin (INDOCIN) 50 mg capsule Take 1 Cap by mouth three (3) times daily.  colchicine 0.6 mg tablet Take 0.6 mg by mouth daily.  omeprazole (PRILOSEC) 20 mg capsule Take 20 mg by mouth daily. No current facility-administered medications for this visit. Past Medical History:   Diagnosis Date    Arthritis     Diabetes ([facility] [address]     Gout     Gout     Hiatal hernia     Hypertension       Past Surgical History:   Procedure Laterality Date    HX WISDOM TEETH EXTRACTION        Social History     Tobacco Use    Smoking status: Never Smoker    Smokeless tobacco: Never Used   Substance Use Topics    Alcohol use: Yes     Alcohol/week: 2.0 standard drinks     Types: 2 Glasses of wine per week      Family History   Problem Relation Age of Onset    Hypertension Mother     Hypertension Father     Diabetes Maternal Aunt     Diabetes Maternal Uncle     Diabetes Maternal Grandmother     Diabetes Maternal Grandfather     Diabetes Paternal Grandmother     Diabetes Paternal Grandfather         No Known Allergies     Assessment/Plan  Diagnoses and all orders for this visit:    1. Type 2 diabetes with nephropathy (HCC) - singnificant neuropathy.   I think that this may be amplified due to decreased blood flow or Raynaud's phenomenon. We will start him on a calcium channel blocker and add as needed gabapentin. We discussed to prevent worsening of his neuropathy he needs to get his blood sugars under control.  -     MICROALBUMIN, UR, RAND W/ MICROALB/CREAT RATIO; Future  -     HEMOGLOBIN A1C WITH EAG; Future    2. Hypertriglyceridemia-not on treatment. We will check his levels today. -     METABOLIC PANEL, COMPREHENSIVE; Future  -     LIPID PANEL; Future    3. Essential hypertension-blood pressure elevated. Given his symptoms in his hands and his feet we will start him on a calcium channel blocker. -     amLODIPine (NORVASC) 5 mg tablet; Take 1 Tab by mouth daily.  -     METABOLIC PANEL, COMPREHENSIVE; Future  -     CBC WITH AUTOMATED DIFF; Future    4. Neuropathy  -     gabapentin (NEURONTIN) 300 mg capsule; Take 1 Cap by mouth two (2) times a day. Max Daily Amount: 600 mg.    5. Gout of left knee, unspecified cause, unspecified chronicity- daily colchicine. No problems recently. -     URIC ACID; Future  -     colchicine 0.6 mg tablet; Take 1 Tab by mouth daily. Danny Flores MD  12/18/2019    This note was created with the help of speech recognition software [employer]) and may contain some 'sound alike' errors. Discussed the patient's BMI with him. The BMI follow up plan is as follows:     dietary management education, guidance, and counseling  encourage exercise  monitor weight  prescribed dietary intake    An After Visit Summary was printed and given to the patient.

## 2019-12-18 NOTE — PATIENT INSTRUCTIONS
Body Mass Index: Care Instructions Your Care Instructions Body mass index (BMI) can help you see if your weight is raising your risk for health problems. It uses a formula to compare how much you weigh with how tall you are. · A BMI lower than 18.5 is considered underweight. · A BMI between 18.5 and 24.9 is considered healthy. · A BMI between 25 and 29.9 is considered overweight. A BMI of 30 or higher is considered obese. If your BMI is in the normal range, it means that you have a lower risk for weight-related health problems. If your BMI is in the overweight or obese range, you may be at increased risk for weight-related health problems, such as high blood pressure, heart disease, stroke, arthritis or joint pain, and diabetes. If your BMI is in the underweight range, you may be at increased risk for health problems such as fatigue, lower protection (immunity) against illness, muscle loss, bone loss, hair loss, and hormone problems. BMI is just one measure of your risk for weight-related health problems. You may be at higher risk for health problems if you are not active, you eat an unhealthy diet, or you drink too much alcohol or use tobacco products. Follow-up care is a key part of your treatment and safety. Be sure to make and go to all appointments, and call your doctor if you are having problems. It's also a good idea to know your test results and keep a list of the medicines you take. How can you care for yourself at home? · Practice healthy eating habits. This includes eating plenty of fruits, vegetables, whole grains, lean protein, and low-fat dairy. · If your doctor recommends it, get more exercise. Walking is a good choice. Bit by bit, increase the amount you walk every day. Try for at least 30 minutes on most days of the week. · Do not smoke. Smoking can increase your risk for health problems.  If you need help quitting, talk to your doctor about stop-smoking programs and medicines. These can increase your chances of quitting for good. · Limit alcohol to 2 drinks a day for men and 1 drink a day for women. Too much alcohol can cause health problems. If you have a BMI higher than 25 · Your doctor may do other tests to check your risk for weight-related health problems. This may include measuring the distance around your waist. A waist measurement of more than 40 inches in men or 35 inches in women can increase the risk of weight-related health problems. · Talk with your doctor about steps you can take to stay healthy or improve your health. You may need to make lifestyle changes to lose weight and stay healthy, such as changing your diet and getting regular exercise. If you have a BMI lower than 18.5 · Your doctor may do other tests to check your risk for health problems. · Talk with your doctor about steps you can take to stay healthy or improve your health. You may need to make lifestyle changes to gain or maintain weight and stay healthy, such as getting more healthy foods in your diet and doing exercises to build muscle. Where can you learn more? Go to http://mark-yanna.info/. Enter S176 in the search box to learn more about \"Body Mass Index: Care Instructions. \" Current as of: October 13, 2016 Content Version: 11.4 © 2618-0850 [employer]. Care instructions adapted under license by [employer] (which disclaims liability or warranty for this information). If you have questions about a medical condition or this instruction, always ask your healthcare professional. [address] any warranty or liability for your use of this information.

## 2019-12-18 NOTE — PROGRESS NOTES
Pt is due for diabetic foot exam. Pt has not been on his medications for over a year, due to not having insurance.

## 2019-12-19 DIAGNOSIS — R74.8 ELEVATED LIVER ENZYMES: Primary | ICD-10-CM

## 2019-12-19 DIAGNOSIS — E11.8 TYPE 2 DIABETES MELLITUS WITH COMPLICATION, WITHOUT LONG-TERM CURRENT USE OF INSULIN (HCC): ICD-10-CM

## 2019-12-19 RX ORDER — METFORMIN HYDROCHLORIDE 500 MG/1
1000 TABLET, EXTENDED RELEASE ORAL
Qty: 180 TAB | Refills: 1 | Status: SHIPPED | OUTPATIENT
Start: 2019-12-19 | End: 2020-01-21 | Stop reason: SDUPTHER

## 2019-12-23 ENCOUNTER — HOSPITAL ENCOUNTER (OUTPATIENT)
Dept: ULTRASOUND IMAGING | Age: 44
Discharge: HOME | End: 2019-12-23
Attending: INTERNAL MEDICINE
Payer: COMMERCIAL

## 2019-12-23 DIAGNOSIS — R74.8 ELEVATED LIVER ENZYMES: ICD-10-CM

## 2019-12-23 PROCEDURE — 76705 ECHO EXAM OF ABDOMEN: CPT

## 2020-01-21 DIAGNOSIS — E11.8 TYPE 2 DIABETES MELLITUS WITH COMPLICATION, WITHOUT LONG-TERM CURRENT USE OF INSULIN (HCC): ICD-10-CM

## 2020-01-21 DIAGNOSIS — I10 ESSENTIAL HYPERTENSION: ICD-10-CM

## 2020-01-21 RX ORDER — AMLODIPINE BESYLATE 5 MG/1
5 TABLET ORAL DAILY
Qty: 90 TAB | Refills: 1 | Status: SHIPPED | OUTPATIENT
Start: 2020-01-21 | End: 2020-03-31 | Stop reason: SDUPTHER

## 2020-01-21 RX ORDER — METFORMIN HYDROCHLORIDE 500 MG/1
1000 TABLET, EXTENDED RELEASE ORAL
Qty: 180 TAB | Refills: 1 | Status: SHIPPED | OUTPATIENT
Start: 2020-01-21 | End: 2020-03-31 | Stop reason: SDUPTHER

## 2020-02-13 ENCOUNTER — TELEPHONE (OUTPATIENT)
Dept: INTERNAL MEDICINE CLINIC | Age: 45
End: 2020-02-13

## 2020-02-13 NOTE — TELEPHONE ENCOUNTER
----- Message from Lucho Aldridge sent at 2/13/2020  2:50 PM EST -----  Regarding: Concepcionin/MyChart  Contact: [phone]  Forwarding for Level 1 symptoms of numbness/tingling - scheduled appt for tomorrow @ 8:15a      Appointment Request From: Shama Salazar III    With Provider: MD Venessa Cedillo [facility]]    Preferred Date Range: 2/14/2020 - 2/15/2020    Preferred Times: Any time    Reason for visit: Request an Appointment    Comments:  Numbness and tingling in both feet and legs.  Cold feet and legs and Color of skin.

## 2020-02-14 ENCOUNTER — HOSPITAL ENCOUNTER (OUTPATIENT)
Dept: LAB | Age: 45
Discharge: HOME | End: 2020-02-14

## 2020-02-14 ENCOUNTER — OFFICE VISIT (OUTPATIENT)
Dept: INTERNAL MEDICINE CLINIC | Age: 45
End: 2020-02-14

## 2020-02-14 VITALS
RESPIRATION RATE: 16 BRPM | BODY MASS INDEX: 30.07 KG/M2 | HEART RATE: 102 BPM | DIASTOLIC BLOOD PRESSURE: 100 MMHG | OXYGEN SATURATION: 98 % | WEIGHT: 222 LBS | SYSTOLIC BLOOD PRESSURE: 158 MMHG | TEMPERATURE: 98.3 F | HEIGHT: 72 IN

## 2020-02-14 DIAGNOSIS — G62.9 NEUROPATHY: ICD-10-CM

## 2020-02-14 DIAGNOSIS — E78.1 HYPERTRIGLYCERIDEMIA: ICD-10-CM

## 2020-02-14 DIAGNOSIS — F10.10 ETOH ABUSE: ICD-10-CM

## 2020-02-14 DIAGNOSIS — E11.40 TYPE 2 DIABETES MELLITUS WITH DIABETIC NEUROPATHY, UNSPECIFIED WHETHER LONG TERM INSULIN USE (HCC): ICD-10-CM

## 2020-02-14 DIAGNOSIS — I10 ESSENTIAL HYPERTENSION: ICD-10-CM

## 2020-02-14 DIAGNOSIS — E11.40 TYPE 2 DIABETES MELLITUS WITH DIABETIC NEUROPATHY, UNSPECIFIED WHETHER LONG TERM INSULIN USE (HCC): Primary | ICD-10-CM

## 2020-02-14 DIAGNOSIS — G47.00 INSOMNIA, UNSPECIFIED TYPE: ICD-10-CM

## 2020-02-14 LAB
ALBUMIN SERPL-MCNC: 4 G/DL (ref 3.5–5)
ALBUMIN/GLOB SERPL: 1 {RATIO} (ref 1.1–2.2)
ALP SERPL-CCNC: 62 U/L (ref 45–117)
ALT SERPL-CCNC: 87 U/L (ref 12–78)
ANION GAP SERPL CALC-SCNC: 7 MMOL/L (ref 5–15)
AST SERPL-CCNC: 41 U/L (ref 15–37)
BILIRUB SERPL-MCNC: 1.1 MG/DL (ref 0.2–1)
BUN SERPL-MCNC: 11 MG/DL (ref 6–20)
BUN/CREAT SERPL: 12 (ref 12–20)
CALCIUM SERPL-MCNC: 9.8 MG/DL (ref 8.5–10.1)
CHLORIDE SERPL-SCNC: 97 MMOL/L (ref 97–108)
CO2 SERPL-SCNC: 30 MMOL/L (ref 21–32)
CREAT SERPL-MCNC: 0.95 MG/DL (ref 0.7–1.3)
GLOBULIN SER CALC-MCNC: 3.9 G/DL (ref 2–4)
GLUCOSE SERPL-MCNC: 303 MG/DL (ref 65–100)
HBA1C MFR BLD HPLC: 9.6 %
POTASSIUM SERPL-SCNC: 4 MMOL/L (ref 3.5–5.1)
PROT SERPL-MCNC: 7.9 G/DL (ref 6.4–8.2)
SODIUM SERPL-SCNC: 134 MMOL/L (ref 136–145)
VIT B12 SERPL-MCNC: 492 PG/ML (ref 193–986)

## 2020-02-14 RX ORDER — GLIMEPIRIDE 2 MG/1
2 TABLET ORAL
Qty: 30 TAB | Refills: 5 | Status: SHIPPED | OUTPATIENT
Start: 2020-02-14

## 2020-02-14 RX ORDER — AMITRIPTYLINE HYDROCHLORIDE 10 MG/1
10 TABLET, FILM COATED ORAL
Qty: 30 TAB | Refills: 1 | Status: SHIPPED | OUTPATIENT
Start: 2020-02-14 | End: 2020-05-06

## 2020-02-14 RX ORDER — FENOFIBRATE 145 MG/1
145 TABLET, COATED ORAL DAILY
Qty: 30 TAB | Refills: 5 | Status: SHIPPED | OUTPATIENT
Start: 2020-02-14

## 2020-02-14 NOTE — PROGRESS NOTES
Roderick Sharp is a 53 y.o. male who presents today for Numbness and Foot Problem  . He has a history of   Patient Active Problem List   Diagnosis Code    Colitis K52.9    HTN (hypertension) I10    Gout M10.9    Inguinal hernia K40.90    Hepatic steatosis K76.0    Meckel diverticulum Q43.0    [facility] E11.21    Hypertriglyceridemia E78.1    Gastroesophageal reflux disease K21.9    Type 2 diabetes mellitus with diabetic neuropathy ([facility] E11.40   . Today patient is here for f/u. Musculoskeletal pain:  He wishes to address discomfort in the Bilateral foot at today's visit. This has been moderate in nature, gradual, starting years ago in onset. Numb and painful in the evening. Notes that gabapentin does help. Does admit to excessive EtOH use. No drink in one week. This is because significant problems at home he is currently living in a hotel. He does believe that if he gets his life back in order they will take him back into his house. He is going to be traveling less for his work and plans on focusing on his health. He has started going to [place]. He does note that sleep is been a big issue for him. Often times he was using alcohol to help him go to sleep. He does note that he does not seem to be able to shut off his brain at night. DM: On metformin. Admits to noncompliance. Not checking his blood sugars regularly. A1c today is elevated. Hypertension - home readings aer in the 140/90's. It seems to decrease alcohol consumption has improved  Hypertension ROS: taking medications as instructed, no medication side effects noted, no TIA's, no chest pain on exertion, no dyspnea on exertion, no swelling of ankles     reports that he has never smoked. He has never used smokeless tobacco.    reports current alcohol use of about 2.0 standard drinks of alcohol per week.    BP Readings from Last 2 Encounters:   02/14/20 (!) 158/100   12/18/19 (!) 138/92 ROS  Review of Systems   Constitutional: Negative for chills, fever and weight loss. HENT: Negative for congestion and sore throat. Eyes: Negative for blurred vision, double vision and photophobia. Respiratory: Negative for cough, sputum production and shortness of breath. Cardiovascular: Negative for chest pain, palpitations and leg swelling. Gastrointestinal: Negative for abdominal pain, constipation, diarrhea, heartburn, nausea and vomiting. Genitourinary: Negative for dysuria, frequency and urgency. Musculoskeletal: Negative for joint pain and myalgias. Skin: Negative for rash. Neurological: Negative. Negative for headaches. Neuropathy   Endo/Heme/Allergies: Does not bruise/bleed easily. Psychiatric/Behavioral: Positive for substance abuse. Negative for depression, hallucinations, memory loss and suicidal ideas. The patient is nervous/anxious and has insomnia. High stress       Visit Vitals  BP (!) 158/100 (BP 1 Location: Left arm, BP Patient Position: Sitting)   Pulse (!) 102   Temp 98.3 °F (36.8 °C) (Oral)   Resp 16   Ht 6' (1.829 m)   Wt 222 lb (100.7 kg)   SpO2 98%   BMI 30.11 kg/m²       Physical Exam      Current Outpatient Medications   Medication Sig    glimepiride (AMARYL) 2 mg tablet Take 1 Tab by mouth every morning.  fenofibrate nanocrystallized (TRICOR) 145 mg tablet Take 1 Tab by mouth daily.  amitriptyline (ELAVIL) 10 mg tablet Take 1 Tab by mouth nightly.  amLODIPine (NORVASC) 5 mg tablet Take 1 Tab by mouth daily.  metFORMIN ER (GLUCOPHAGE XR) 500 mg tablet Take 2 Tabs by mouth daily (with dinner). Appointment needed for additional refills    gabapentin (NEURONTIN) 300 mg capsule Take 1 Cap by mouth two (2) times a day. Max Daily Amount: 600 mg.    ONETOUCH DELICA LANCETS 30 gauge misc CHECK FASTING BLOOD GLUCOSE DAILY.  Blood-Glucose Meter monitoring kit Check fasting BG daily.  Lancets misc Check fasting BG daily.     glucose blood VI test strips (ASCENSIA AUTODISC VI, ONE TOUCH ULTRA TEST VI) strip Check fasting BG daily.  omeprazole (PRILOSEC) 20 mg capsule Take 20 mg by mouth daily.  indomethacin (INDOCIN) 50 mg capsule Take 1 Cap by mouth three (3) times daily. No current facility-administered medications for this visit. Past Medical History:   Diagnosis Date    Arthritis     Diabetes ([address]     Gout     Gout     Hiatal hernia     Hypertension       Past Surgical History:   Procedure Laterality Date    HX WISDOM TEETH EXTRACTION        Social History     Tobacco Use    Smoking status: Never Smoker    Smokeless tobacco: Never Used   Substance Use Topics    Alcohol use: Yes     Alcohol/week: 2.0 standard drinks     Types: 2 Glasses of wine per week      Family History   Problem Relation Age of Onset    Hypertension Mother     Hypertension Father     Diabetes Maternal Aunt     Diabetes Maternal Uncle     Diabetes Maternal Grandmother     Diabetes Maternal Grandfather     Diabetes Paternal Grandmother     Diabetes Paternal Grandfather         No Known Allergies     Assessment/Plan  Diagnoses and all orders for this visit:    1. Type 2 diabetes mellitus with diabetic neuropathy, unspecified whether long term insulin use (HCC)-admits to noncompliance. Patient to start taking his blood sugars regularly. He will continue metformin and add glimepiride in the morning. We will check a B12 level but more than likely alcohol use and diabetes as a cause of his neuropathy.  -     AMB POC HEMOGLOBIN A1C  -     glimepiride (AMARYL) 2 mg tablet; Take 1 Tab by mouth every morning.  -     VITAMIN B12; Future    2. Neuropathy-continue to take PRN gabapentin. Notes that during the day cannot take this as it makes him groggy. -     VITAMIN B12; Future  -     PROTEIN ELECTROPHORESIS; Future  -     METABOLIC PANEL, COMPREHENSIVE; Future    3. Essential hypertension-blood pressure borderline. Continue to monitor     4. Hypertriglyceridemia -he will resume his TriCor as he has been off this for a little while. -  -     fenofibrate nanocrystallized (TRICOR) 145 mg tablet; Take 1 Tab by mouth daily. 5. Insomnia, unspecified type-we will try low-dose TCA at bedtime to see if this can help him sleep and avoid using alcohol.  -     amitriptyline (ELAVIL) 10 mg tablet; Take 1 Tab by mouth nightly. 6. ETOH abuse-completely stop drinking enjoyed alcoholics anonymous. This is led to a large amount of stress in his life and his family. Follow-up and Dispositions    · Return in about 4 weeks (around 3/13/2020) for BG check. Savannah Terry MD  2/14/2020    This note was created with the help of speech recognition software [employer]) and may contain some 'sound alike' errors.

## 2020-02-17 LAB
ALBUMIN SERPL ELPH-MCNC: 3.8 G/DL (ref 2.9–4.4)
ALBUMIN/GLOB SERPL: 1 {RATIO} (ref 0.7–1.7)
ALPHA1 GLOB SERPL ELPH-MCNC: 0.2 G/DL (ref 0–0.4)
ALPHA2 GLOB SERPL ELPH-MCNC: 1 G/DL (ref 0.4–1)
B-GLOBULIN SERPL ELPH-MCNC: 1.4 G/DL (ref 0.7–1.3)
GAMMA GLOB SERPL ELPH-MCNC: 1.2 G/DL (ref 0.4–1.8)
GLOBULIN SER CALC-MCNC: 3.8 G/DL (ref 2.2–3.9)
M PROTEIN SERPL ELPH-MCNC: ABNORMAL G/DL
PROT SERPL-MCNC: 7.6 G/DL (ref 6–8.5)

## 2020-03-05 DIAGNOSIS — G62.9 NEUROPATHY: ICD-10-CM

## 2020-03-05 RX ORDER — GABAPENTIN 300 MG/1
300 CAPSULE ORAL 2 TIMES DAILY
Qty: 180 CAP | Refills: 1 | Status: SHIPPED | OUTPATIENT
Start: 2020-03-05 | End: 2020-11-13

## 2020-03-31 DIAGNOSIS — E11.8 TYPE 2 DIABETES MELLITUS WITH COMPLICATION, WITHOUT LONG-TERM CURRENT USE OF INSULIN (HCC): ICD-10-CM

## 2020-03-31 DIAGNOSIS — I10 ESSENTIAL HYPERTENSION: ICD-10-CM

## 2020-03-31 RX ORDER — AMLODIPINE BESYLATE 5 MG/1
5 TABLET ORAL DAILY
Qty: 90 TAB | Refills: 1 | Status: SHIPPED | OUTPATIENT
Start: 2020-03-31

## 2020-03-31 RX ORDER — METFORMIN HYDROCHLORIDE 500 MG/1
1000 TABLET, EXTENDED RELEASE ORAL
Qty: 180 TAB | Refills: 1 | Status: SHIPPED | OUTPATIENT
Start: 2020-03-31

## 2020-04-20 DIAGNOSIS — M10.9 GOUT OF LEFT KNEE, UNSPECIFIED CAUSE, UNSPECIFIED CHRONICITY: ICD-10-CM

## 2020-04-20 RX ORDER — COLCHICINE 0.6 MG/1
0.6 TABLET ORAL DAILY
Qty: 90 TAB | Refills: 1 | Status: SHIPPED | OUTPATIENT
Start: 2020-04-20 | End: 2020-12-03

## 2020-05-06 DIAGNOSIS — G47.00 INSOMNIA, UNSPECIFIED TYPE: ICD-10-CM

## 2020-05-06 RX ORDER — AMITRIPTYLINE HYDROCHLORIDE 10 MG/1
TABLET, FILM COATED ORAL
Qty: 30 TAB | Refills: 1 | Status: SHIPPED | OUTPATIENT
Start: 2020-05-06 | End: 2020-05-21 | Stop reason: SDUPTHER

## 2020-05-21 DIAGNOSIS — G47.00 INSOMNIA, UNSPECIFIED TYPE: ICD-10-CM

## 2020-05-21 RX ORDER — AMITRIPTYLINE HYDROCHLORIDE 10 MG/1
TABLET, FILM COATED ORAL
Qty: 90 TAB | Refills: 0 | Status: SHIPPED | OUTPATIENT
Start: 2020-05-21

## 2020-11-13 DIAGNOSIS — G62.9 NEUROPATHY: ICD-10-CM

## 2020-11-13 RX ORDER — GABAPENTIN 300 MG/1
CAPSULE ORAL
Qty: 180 CAP | Refills: 1 | Status: SHIPPED | OUTPATIENT
Start: 2020-11-13

## 2020-12-03 DIAGNOSIS — M10.9 GOUT OF LEFT KNEE, UNSPECIFIED CAUSE, UNSPECIFIED CHRONICITY: ICD-10-CM

## 2020-12-03 RX ORDER — COLCHICINE 0.6 MG/1
TABLET, FILM COATED ORAL
Qty: 90 TAB | Refills: 3 | Status: SHIPPED | OUTPATIENT
Start: 2020-12-03

## 2022-03-30 DIAGNOSIS — M10.9 GOUT OF LEFT KNEE, UNSPECIFIED CAUSE, UNSPECIFIED CHRONICITY: ICD-10-CM

## 2022-03-30 DIAGNOSIS — G62.9 NEUROPATHY: ICD-10-CM

## 2022-03-30 RX ORDER — COLCHICINE 0.6 MG/1
TABLET ORAL
Qty: 90 TABLET | Refills: 3 | OUTPATIENT
Start: 2022-03-30

## 2022-03-30 RX ORDER — GABAPENTIN 300 MG/1
CAPSULE ORAL
Qty: 180 CAPSULE | Refills: 1 | OUTPATIENT
Start: 2022-03-30

## 2022-03-30 NOTE — TELEPHONE ENCOUNTER
LVM to call back and schedule an appt. Advised pt to send [employer] msg as another option.